# Patient Record
Sex: FEMALE | Race: WHITE | NOT HISPANIC OR LATINO | Employment: PART TIME | URBAN - METROPOLITAN AREA
[De-identification: names, ages, dates, MRNs, and addresses within clinical notes are randomized per-mention and may not be internally consistent; named-entity substitution may affect disease eponyms.]

---

## 2020-03-31 ENCOUNTER — TELEPHONE (OUTPATIENT)
Dept: FAMILY MEDICINE | Facility: CLINIC | Age: 30
End: 2020-03-31

## 2020-03-31 NOTE — TELEPHONE ENCOUNTER
Reason for call:  Patient reporting a symptom    Symptom or request: Patient's significant other was tested positive for chlamydia. She is currently having a burning sensation when she urinates and has been needing to go to the bathroom often. She is wondering if she needs to be tested, or what should she do.     Duration (how long have symptoms been present): the last few days    Have you been treated for this before? No    Additional comments:     Phone Number patient can be reached at:  Home number on file [phone] (home)    Best Time:  any    Can we leave a detailed message on this number:  YES    Call taken on 3/31/2020 at 5:01 PM by Anabel Law CNA

## 2023-09-11 ASSESSMENT — PATIENT HEALTH QUESTIONNAIRE - PHQ9
SUM OF ALL RESPONSES TO PHQ QUESTIONS 1-9: 9
10. IF YOU CHECKED OFF ANY PROBLEMS, HOW DIFFICULT HAVE THESE PROBLEMS MADE IT FOR YOU TO DO YOUR WORK, TAKE CARE OF THINGS AT HOME, OR GET ALONG WITH OTHER PEOPLE: SOMEWHAT DIFFICULT
SUM OF ALL RESPONSES TO PHQ QUESTIONS 1-9: 9

## 2023-09-12 ENCOUNTER — OFFICE VISIT (OUTPATIENT)
Dept: OBGYN | Facility: OTHER | Age: 33
End: 2023-09-12
Payer: COMMERCIAL

## 2023-09-12 ENCOUNTER — TELEPHONE (OUTPATIENT)
Dept: OBGYN | Facility: OTHER | Age: 33
End: 2023-09-12

## 2023-09-12 VITALS
BODY MASS INDEX: 18.98 KG/M2 | DIASTOLIC BLOOD PRESSURE: 86 MMHG | SYSTOLIC BLOOD PRESSURE: 130 MMHG | WEIGHT: 132.28 LBS

## 2023-09-12 DIAGNOSIS — N97.9 FEMALE INFERTILITY: Primary | ICD-10-CM

## 2023-09-12 PROCEDURE — 99203 OFFICE O/P NEW LOW 30 MIN: CPT | Performed by: OBSTETRICS & GYNECOLOGY

## 2023-09-12 NOTE — PROGRESS NOTES
"  SUBJECTIVE:       I spent a total of 35 minutes on the care of Mecca on the day of service including 25 minutes of face-to-face time with remainder in chart review, care coordination, documentation on the date of service.                                                Mecca Bills is a 33 year old female who presents to clinic today for the following health issue(s):  No chief complaint on file.    Mecca is a healthy 33-year-old P0 last menstrual period 9 4 on no birth control she presents for discussion regarding infertility.  She has been trying for approximately 5 months to get pregnant but met with no success.  She has never had children before and she has been with the same significant other for 12 years.  She does use pullout as a method of birth control.  Up until then she has met with success with the birth control perspective but now is trying to get pregnant.    GYN history she notes that her periods are \"not normal \".  On the other hand everything in the review of systems of her GYN history is normal except for a history of chlamydia when she was a teenager.  She notes that when she was younger her periods always came on the 15th of the month or thereabouts.  Currently her cycles, every 30 days and last 7 days.  She has normal lumina which includes cramping, bloating, moodiness, breast tenderness.  That happens with each of her cycles.  She has had no abnormal Pap smears.  She has used Plan B in the past also.  She has had no pelvic surgeries or GYN procedures.    Social history:  She is with her significant other for 12 years now and not conceived    Medications:  Negative    Past surgical history:  Negative for any abdominal procedures    Past medical history:  Negative    Family history:  Dad has had a pacemaker placed for arrhythmia she is not well familiarized with his particular situation.  There is diabetes only in the grandmother.    She denies significant amount of time today regarding " evaluation and treatment and diagrammed the uterus.  Explained the significance of the chlamydial infection and how that can affect fertility with scarring of the tubes and that an HSG is appropriate.  We talked about labs to have done on day 3 and that for her would include a TSH, FSH, and prolactin.  Also discussed having a HSG and that order has been placed.  The HSG I advised her to take Motrin ahead of time as an 600 mg.  This would be done between day 6 and day 10 and she knows that it may cause some cramping.  With her lengthy relationship and not getting pregnant even though the use to pull out methods suggest that its possible that the fertility issue is a male factor and she should have him evaluated with a semen analysis.  She will get back to us with regards to his name and number and and will need to have that set up as he has never been to an [facility] facility.    All her questions were addressed and at this present time we will do the main 2 things of lab work and an HSG.  Get back to work regarding potential semen analysis.  We also discussed timing of colitis and with this cycle that is consistently had 30 days suggest that ovulation is likely to be day 16-kemi.  She does use an ovulation predictor.    Assessment is subfertility    Plan:  Lab work is been ordered as is an HSG and then follow-up after that in the next 30 days    No LMP recorded..     Patient is sexually active, No obstetric history on file..  Using oral contraceptives for contraception.    reports that she has been smoking. She has never used smokeless tobacco.    STD testing offered?  Declined    Health maintenance updated:  no    Today's PHQ-2 Score:       9/11/2023    10:19 AM   PHQ-2 ( 1999 [employer])   Q1: Little interest or pleasure in doing things 1   Q2: Feeling down, depressed or hopeless 2   PHQ-2 Score 3   Q1: Little interest or pleasure in doing things Several days   Q2: Feeling down, depressed or hopeless More than half the  days   PHQ-2 Score 3     Today's PHQ-9 Score:       9/11/2023    10:19 AM   PHQ-9 SCORE   PHQ-9 Total Score [facility] 9 (Mild depression)   PHQ-9 Total Score 9     Today's SAUD-7 Score:       6/22/2015    11:11 AM   SAUD-7 SCORE   Total Score 18       Problem list and histories reviewed & adjusted, as indicated.  Additional history: as documented.    Patient Active Problem List   Diagnosis    Generalized anxiety disorder    Major depression, recurrent (H)     No past surgical history on file.   Social History     Tobacco Use    Smoking status: Every Day    Smokeless tobacco: Never   Substance Use Topics    Alcohol use: Yes           Negative family history of: Breast Cancer              Current Outpatient Medications   Medication Sig    citalopram (CELEXA) 20 MG tablet Take 1/2 tablet (10 mg) for 1 weeks, then increase to 1 tablet orally daily    norgestim-eth estrad triphasic (ORTHO TRI-CYCLEN, 28,) 0.18/0.215/0.25 MG-35 MCG TABS tablet Take 1 tablet by mouth daily     No current facility-administered medications for this visit.     No Known Allergies      OBJECTIVE:     There were no vitals taken for this visit.  There is no height or weight on file to calculate BMI.    Exam:  Deferred    In-Clinic Test Results:  No results found for this or any previous visit (from the past 24 hour(s)).    ASSESSMENT/PLAN:                                                        Patient Instructions                                                      If you have any questions regarding your visit, Please contact your care team.     [facility] Access Services: [phone]    To Schedule an Appointment 24/7  Call: [address] [place] [address] HOURS TELEPHONE NUMBER     Brandyn Slater MD    Medical Assistant    Magalie Singh-Surgery Scheduler  Mil[facility]ros-Surgery Scheduler     [place]  1:00 pm-4:30 pm    Tuesday-[place]  7:30 am-4:30 pm    Wednesday-[place]  7:30 am-4:30 pm        Typical Surgery  Days: Monday or Thursday       [facility]  [address] [place] [id number]  [phone] appointment line  [phone] Fax    Imaging Scheduling all locations  [phone]    **Surgeries** Our Surgery Schedulers will contact you to schedule. If you do not receive a call within 3 business days, please call [phone].    If you need a medication refill, please contact your pharmacy. Please allow 3 business days for your refill to be completed.    As always, Thank you for trusting us with your healthcare needs!                   see additional instructions from your care team below          Brandyn Slater MD  [facility]  Answers submitted by the patient for this visit:  Patient Health Questionnaire (Submitted on 9/11/2023)  If you checked off any problems, how difficult have these problems made it for you to do your work, take care of things at home, or get along with other people?: Somewhat difficult  PHQ9 TOTAL SCORE: 9

## 2023-09-12 NOTE — TELEPHONE ENCOUNTER
Pt was seen by Dr. Slater today, 9/12.  Dr. Slater ordered an HSG for pt.    Pt is on cycle day #9, LMP: 9/4.  Reached out to the [facility] imaging coordinator to see if there is an opening for an HSG in [facility] in the next few days for her to do her HSG this cycle.    [facility] could do an HSG tomorrow, 9/13, at 1 pm.     Spoke with pt and she will take this appt.  Gave instructions for HSG.    Jannette Schmitz RN

## 2023-09-12 NOTE — PATIENT INSTRUCTIONS
If you have any questions regarding your visit, Please contact your care team.     [employer] Services: [phone]    To Schedule an Appointment 24/7  [facility]: [phone]     Brandyn Slater MD    Medical Assistant    Magalie Singh-Surgery Scheduler  [facility]-Surgery Scheduler     Monday-[place]  1:00 pm-4:30 pm    Tuesday-[place]  7:30 am-4:30 pm    Wednesday-[place]  7:30 am-4:30 pm        Typical Surgery Days: Monday or Thursday       [address] [address]  [phone] appointment line  [phone] Fax    Imaging Scheduling all locations  [phone]    **Surgeries** Our Surgery Schedulers will contact you to schedule. If you do not receive a call within 3 business days, please call [phone].    If you need a medication refill, please contact your pharmacy. Please allow 3 business days for your refill to be completed.    As always, Thank you for trusting us with your healthcare needs!                   see additional instructions from your care team below

## 2023-09-13 ENCOUNTER — ANCILLARY PROCEDURE (OUTPATIENT)
Dept: GENERAL RADIOLOGY | Facility: CLINIC | Age: 33
End: 2023-09-13
Attending: OBSTETRICS & GYNECOLOGY
Payer: COMMERCIAL

## 2023-09-13 DIAGNOSIS — N97.9 FEMALE INFERTILITY: ICD-10-CM

## 2023-09-13 PROCEDURE — 58340 CATHETER FOR HYSTEROGRAPHY: CPT | Performed by: STUDENT IN AN ORGANIZED HEALTH CARE EDUCATION/TRAINING PROGRAM

## 2023-09-13 PROCEDURE — 74740 X-RAY FEMALE GENITAL TRACT: CPT | Performed by: STUDENT IN AN ORGANIZED HEALTH CARE EDUCATION/TRAINING PROGRAM

## 2023-10-02 ENCOUNTER — VIRTUAL VISIT (OUTPATIENT)
Dept: FAMILY MEDICINE | Facility: CLINIC | Age: 33
End: 2023-10-02
Payer: COMMERCIAL

## 2023-10-02 VITALS — BODY MASS INDEX: 18.45 KG/M2 | HEIGHT: 71 IN

## 2023-10-02 DIAGNOSIS — Z34.00 SUPERVISION OF NORMAL FIRST PREGNANCY: Primary | ICD-10-CM

## 2023-10-02 PROCEDURE — 99207 PR NO CHARGE NURSE ONLY: CPT

## 2023-10-02 NOTE — PATIENT INSTRUCTIONS
Learning About Pregnancy  Your Care Instructions     Your health in the early weeks of your pregnancy is particularly important for your baby's health. Take good care of yourself. Anything you do that harms your body can also harm your baby.  Make sure to go to all of your doctor appointments. Regular checkups will help keep you and your baby healthy.  How can you care for yourself at home?  Diet    Eat a balanced diet. Make sure your diet includes plenty of beans, peas, and leafy green vegetables.     Do not skip meals or go for many hours without eating. If you are nauseated, try to eat a small, healthy snack every 2 to 3 hours.     Do not eat fish that has a high level of mercury, such as shark, swordfish, or mackerel. Do not eat more than one can of tuna each week.     Drink plenty of fluids. If you have kidney, heart, or liver disease and have to limit fluids, talk with your doctor before you increase the amount of fluids you drink.     Cut down on caffeine, such as coffee, tea, and cola.     Do not drink alcohol, such as beer, wine, or hard liquor.     Take a multivitamin that contains at least 400 micrograms (mcg) of folic acid to help prevent birth defects. Fortified cereal and whole wheat bread are good additional sources of folic acid.     Increase the calcium in your diet. Try to drink a quart of skim milk each day. You may also take calcium supplements and choose foods such as cheese and yogurt.   Lifestyle    Make sure you go to your follow-up appointments.     Get plenty of rest. You may be unusually tired while you are pregnant.     Get at least 30 minutes of exercise on most days of the week. Walking is a good choice. If you have not exercised in the past, start out slowly. Take several short walks each day.     Do not smoke. If you need help quitting, talk to your doctor about stop-smoking programs. These can increase your chances of quitting for good.     Do not touch cat feces or litter boxes.  Also, wash your hands after you handle raw meat, and fully cook all meat before you eat it. Wear gloves when you work in the yard or garden, and wash your hands well when you are done. Cat feces, raw or undercooked meat, and contaminated dirt can cause an infection that may harm your baby or lead to a miscarriage.     Avoid things that can make your body too hot and may be harmful to your baby, such as a hot tub or sauna. Or talk with your doctor before doing anything that raises your body temperature. Your doctor can tell you if it's safe.     Avoid chemical fumes, paint fumes, or poisons.     Do not use illegal drugs, marijuana, or alcohol.   Medicines    Review all of your medicines with your doctor. Some of your routine medicines may need to be changed to protect your baby.     Use acetaminophen (Tylenol) to relieve minor problems, such as a mild headache or backache or a mild fever with cold symptoms. Do not use nonsteroidal anti-inflammatory drugs (NSAIDs), such as ibuprofen (Advil, Motrin) or naproxen (Aleve), unless your doctor says it is okay.     Do not take two or more pain medicines at the same time unless the doctor told you to. Many pain medicines have acetaminophen, which is Tylenol. Too much acetaminophen (Tylenol) can be harmful.     Take your medicines exactly as prescribed. Call your doctor if you think you are having a problem with your medicine.   To manage morning sickness    If you feel sick when you first wake up, try eating a small snack (such as crackers) before you get out of bed. Allow some time to digest the snack, and then get out of bed slowly.     Do not skip meals or go for long periods without eating. An empty stomach can make nausea worse.     Eat small, frequent meals instead of three large meals each day.     Drink plenty of fluids.     Eat foods that are high in protein but low in fat.     If you are taking iron supplements, ask your doctor if they are necessary. Iron can make  "nausea worse.     Avoid any smells, such as coffee, that make you feel sick.     Get lots of rest. Morning sickness may be worse when you are tired.   Follow-up care is a key part of your treatment and safety. Be sure to make and go to all appointments, and call your doctor if you are having problems. It's also a good idea to know your test results and keep a list of the medicines you take.  Where can you learn more?  Go to https://www.[employer].net/patiented  Enter E868 in the search box to learn more about \"Learning About Pregnancy.\"  Current as of: November 9, 2022               Content Version: 13.7    5493-4513 [employer].   Care instructions adapted under license by your healthcare professional. If you have questions about a medical condition or this instruction, always ask your healthcare professional. [employer] disclaims any warranty or liability for your use of this information.      Weeks 6 to 10 of Your Pregnancy: Care Instructions  During these weeks of pregnancy, your body goes through many changes. You may start to feel different, both in your body and your emotions. Each pregnancy is different, so there's no \"right\" way to feel. These early weeks are a time to make healthy choices for you and your pregnancy.    Take a daily prenatal vitamin. Choose one with folic acid in it.   Avoid alcohol, tobacco, and drugs (including marijuana). If you need help quitting, talk to your doctor.     Drink plenty of liquids.  Be sure to drink enough water. And limit sodas, other sweetened drinks, and caffeine.     Choose foods that are good sources of calcium, iron, and folate.  You can try dairy products, dark leafy greens, fortified orange juice and cereals, almonds, broccoli, dried fruit, and beans.     Avoid foods that may be harmful.  Don't eat raw meat, deli meat, raw seafood, or raw eggs. Avoid soft cheese and unpasteurized dairy, like Brie and blue cheese. And don't eat fish that " "contains a lot of mercury, like shark and swordfish.     Don't touch navjot litter or cat poop.  They can cause an infection that could be harmful during pregnancy.     Avoid things that can make your body too hot.  For example, avoid hot tubs and saunas.     Soothe morning sickness.  Try eating 5 or 6 small meals a day, getting some fresh air, or using sean to control symptoms.     Ask your doctor about flu and COVID-19 shots.  Getting them can help protect against infection.   Follow-up care is a key part of your treatment and safety. Be sure to make and go to all appointments, and call your doctor if you are having problems. It's also a good idea to know your test results and keep a list of the medicines you take.  Where can you learn more?  Go to https://www.Klosetshop.CommonFloor/patiented  Enter G112 in the search box to learn more about \"Weeks 6 to 10 of Your Pregnancy: Care Instructions.\"  Current as of: November 9, 2022               Content Version: 13.7 2006-2023 [employer].   Care instructions adapted under license by your healthcare professional. If you have questions about a medical condition or this instruction, always ask your healthcare professional. [employer] disclaims any warranty or liability for your use of this information.         Managing Morning Sickness (01:55)  Your health professional recommends that you watch this short online health video.  Learn tips for dealing with morning sickness, no matter what time of day you have it.  Purpose:  Gives tips for managing morning sickness, including eating small low-fat meals and avoiding caffeine and spicy food.  Goal:  The user will learn tips for dealing with morning sickness during pregnancy.     How to watch the video    Scan the QR code   OR Visit the website    [email or web address]   Current as of: November 9, 2022               Content Version: 13.7 2006-2023 [employer].   Care instructions " adapted under license by your healthcare professional. If you have questions about a medical condition or this instruction, always ask your healthcare professional. Healthwise, [employer] disclaims any warranty or liability for your use of this information.      Pregnancy and Heartburn: Care Instructions  Overview     Heartburn is a common problem during pregnancy.  Heartburn happens when stomach acid backs up into the tube that carries food to the stomach. This tube is called the esophagus. Early in pregnancy, heartburn is caused by hormone changes that slow down digestion. Later on, it's also caused by the large uterus pushing up on the stomach.  Even though you can't fix the cause, there are things you can do to get relief. Treating heartburn during pregnancy focuses first on making lifestyle changes, like changing what and how you eat, and on taking medicines.  Heartburn usually improves or goes away after childbirth.  Follow-up care is a key part of your treatment and safety. Be sure to make and go to all appointments, and call your doctor if you are having problems. It's also a good idea to know your test results and keep a list of the medicines you take.  How can you care for yourself at home?  Eat small, frequent meals.  Avoid foods that make your symptoms worse, such as chocolate, peppermint, and spicy foods. Avoid drinks with caffeine, such as coffee, tea, and sodas.  Avoid bending over or lying down after meals.  Take a short walk after you eat.  If heartburn is a problem at night, do not eat for 2 hours before bedtime.  Take antacids like Mylanta, Maalox, Rolaids, or Tums. Do not take antacids that have sodium bicarbonate, magnesium trisilicate, or aspirin. Be careful when you take over-the-counter antacid medicines. Many of these medicines have aspirin in them. While you are pregnant, do not take aspirin or medicines that contain aspirin unless your doctor says it is okay.  If you're not getting  "relief, talk to your doctor. You may be able to take a stronger acid-reducing medicine.  When should you call for help?   Call your doctor now or seek immediate medical care if:    You have new or worse belly pain.     You are vomiting.   Watch closely for changes in your health, and be sure to contact your doctor if:    You have new or worse symptoms of reflux.     You are losing weight.     You have trouble or pain swallowing.     You do not get better as expected.   Where can you learn more?  Go to https://www.[employer]net/patiented  Enter U946 in the search box to learn more about \"Pregnancy and Heartburn: Care Instructions.\"  Current as of: November 9, 2022               Content Version: 13.7 2006-2023 [employer].   Care instructions adapted under license by your healthcare professional. If you have questions about a medical condition or this instruction, always ask your healthcare professional. [employer] disclaims any warranty or liability for your use of this information.      Constipation: Care Instructions  Overview     Constipation means that you have a hard time passing stools (bowel movements). People pass stools from 3 times a day to once every 3 days. What is normal for you may be different. Constipation may occur with pain in the rectum and cramping. The pain may get worse when you try to pass stools. Sometimes there are small amounts of bright red blood on toilet paper or the surface of stools. This is because of enlarged veins near the rectum (hemorrhoids).  A few changes in your diet and lifestyle may help you avoid ongoing constipation. Your doctor may also prescribe medicine to help loosen your stool.  Some medicines can cause constipation. These include pain medicines and antidepressants. Tell your doctor about all the medicines you take. Your doctor may want to make a medicine change to ease your symptoms.  Follow-up care is a key part of your treatment and " "safety. Be sure to make and go to all appointments, and call your doctor if you are having problems. It's also a good idea to know your test results and keep a list of the medicines you take.  How can you care for yourself at home?  Drink plenty of fluids. If you have kidney, heart, or liver disease and have to limit fluids, talk with your doctor before you increase the amount of fluids you drink.  Include high-fiber foods in your diet each day. These include fruits, vegetables, beans, and whole grains.  Get at least 30 minutes of exercise on most days of the week. Walking is a good choice. You also may want to do other activities, such as running, swimming, cycling, or playing tennis or team sports.  Take a fiber supplement, such as Citrucel or Metamucil, every day. Read and follow all instructions on the label.  Schedule time each day for a bowel movement. A daily routine may help. Take your time having a bowel movement, but don't sit for more than 10 minutes at a time. And don't strain too much.  Support your feet with a small step stool when you sit on the toilet. This helps flex your hips and places your pelvis in a squatting position.  Your doctor may recommend an over-the-counter laxative to relieve your constipation. Examples are Milk of Magnesia and MiraLax. Read and follow all instructions on the label. Do not use laxatives on a long-term basis.  When should you call for help?   Call your doctor now or seek immediate medical care if:    You have new or worse belly pain.     You have new or worse nausea or vomiting.     You have blood in your stools.   Watch closely for changes in your health, and be sure to contact your doctor if:    Your constipation is getting worse.     You do not get better as expected.   Where can you learn more?  Go to https://www.healthwise.net/patiented  Enter P343 in the search box to learn more about \"Constipation: Care Instructions.\"  Current as of: March 22, " "2023               Content Version: 13.7 2006-2023 [employer].   Care instructions adapted under license by your healthcare professional. If you have questions about a medical condition or this instruction, always ask your healthcare professional. [employer] disclaims any warranty or liability for your use of this information.      Learning About High-Iron Foods  What foods are high in iron?     The foods you eat contain nutrients, such as vitamins and minerals. Iron is a nutrient. Your body needs the right amount to stay healthy and work as it should. You can use the list below to help you make choices about which foods to eat.  Here are some foods that contain iron. They have 1 to 2 milligrams of iron per serving.  Fruits  Figs (dried), 5 figs  Vegetables  Asparagus (canned), 6 vang  Mike, beet, Swiss chard, or turnip greens, 1 cup  Dried peas, cooked,   cup  Seaweed, spirulina (dried),   cup  Spinach, (cooked)   cup or (raw) 1 cup  Grains  Cereals, fortified with iron, 1 cup  Grits (instant, cooked), fortified with iron,   cup  Meats and other protein foods  Beans (kidney, lima, navy, white), canned or cooked,   cup  Beef or lamb, 3 oz  Chicken giblets, 3 oz  Chickpeas (garbanzo beans),   cup  Liver of beef, lamb, or pork, 3 oz  Oysters (cooked), 3 oz  Sardines (canned), 3 oz  Soybeans (boiled),   cup  Tofu (firm),   cup  Work with your doctor to find out how much of this nutrient you need. Depending on your health, you may need more or less of it in your diet.  Where can you learn more?  Go to https://www.healthTagstr.net/patiented  Enter R005 in the search box to learn more about \"Learning About High-Iron Foods.\"  Current as of: March 1, 2023               Content Version: 13.7 2006-2023 [employer].   Care instructions adapted under license by your healthcare professional. If you have questions about a medical condition or this instruction, always ask your " "healthcare professional. [employer], [facility] disclaims any warranty or liability for your use of this information.      Rh Antibodies Screening During Pregnancy: About This Test  What is it?     The Rh antibodies screening test is a blood test. It checks your blood for Rh antibodies. If you have Rh-negative blood and have been exposed to Rh-positive blood, your immune system may make antibodies to attack the Rh-positive blood. When a pregnant woman has these antibodies, it is called Rh sensitization.  Why is this test done?  The Rh antibodies screening test is done during pregnancy to find out if your baby is at risk for Rh disease. This can happen if you have Rh-negative blood and your baby has Rh-positive blood. If your Rh-negative blood mixes with Rh-positive blood, your immune system will make antibodies to attack the Rh-positive blood.  During pregnancy, these antibodies could attach to the baby's red blood cells. This can cause your baby to have serious health problems. The results of this test will help your doctor know how to best care for you and your baby during your pregnancy.  How do you prepare for the test?  In general, there's nothing you have to do before this test, unless your doctor tells you to.  How is the test done?  A health professional uses a needle to take a blood sample, usually from the arm.  What happens after the test?  You will probably be able to go home right away. It depends on the reason for the test.  You can go back to your usual activities right away.  Follow-up care is a key part of your treatment and safety. Be sure to make and go to all appointments, and call your doctor if you are having problems. It's also a good idea to keep a list of the medicines you take. Ask your doctor when you can expect to have your test results.  Where can you learn more?  Go to https://www.[employer].net/patiented  Enter P722 in the search box to learn more about \"Rh Antibodies Screening " "During Pregnancy: About This Test.\"  Current as of: 2022               Content Version: 13.7    6510-2469 [employer].   Care instructions adapted under license by your healthcare professional. If you have questions about a medical condition or this instruction, always ask your healthcare professional. [employer] disclaims any warranty or liability for your use of this information.      Learning About Preventing Rh Disease  What is Rh disease?     Rh disease can be a serious problem in pregnancy. It happens when substances called antibodies in the mother's blood cause red blood cells in her baby's blood to be destroyed. This can occur when the blood types of a mother and her baby do not match.  All blood has an Rh factor. This is what makes a blood type positive or negative. When you are Rh-negative, your baby may be Rh-negative or Rh-positive. If your baby has Rh-positive blood and it mixes with yours, your body will make antibodies. This is called Rh sensitization.  Most of the time, this is not a problem in a first pregnancy. But in future pregnancies, it could cause Rh disease.  A  with Rh disease has mild anemia and may have jaundice. In severe cases, anemia, jaundice, and swelling can be very dangerous or fatal. Some babies need to be delivered early. Some need special care in the NICU. A very sick baby will need a blood transfusion before or after birth.  Fortunately, Rh sensitization is usually easy to prevent.  That's why it's important to get your Rh status checked in your first trimester. It doesn't cause any warning signs. A blood test is the only way to know if you are Rh-sensitive or are at risk for it.  How can you prevent Rh disease?  If you are Rh-negative, your doctor gives you an Rh immune globulin shot (such as RhoGAM). It helps prevent your body from making the antibodies that attack your baby's red blood cells.  Timing is important. You need the " "shot at certain times during your pregnancy. And you need one anytime there is a chance that your baby's blood might mix with yours. That can happen with certain prenatal tests or when you have pregnancy bleeding, such as:  Right after any pregnancy loss, amniocentesis, or CVS testing.  After turning of a breech baby.  Before and maybe after childbirth. Your doctor gives you a shot around week 28. If your  is Rh-positive, you will have another shot.  Follow-up care is a key part of your treatment and safety. Be sure to make and go to all appointments, and call your doctor if you are having problems. It's also a good idea to know your test results and keep a list of the medicines you take.  Where can you learn more?  Go to https://www.Flickme.LightPole/patiented  Enter W177 in the search box to learn more about \"Learning About Preventing Rh Disease.\"  Current as of: 2022               Content Version: 13.7    8691-[address].   Care instructions adapted under license by your healthcare professional. If you have questions about a medical condition or this instruction, always ask your healthcare professional. [employer] disclaims any warranty or liability for your use of this information.      Learning About Rh Immunoglobulin Shots  Introduction     An Rh immunoglobulin shot is given to pregnant women who have Rh-negative blood.  You may have Rh-negative blood, and your baby may have Rh-positive blood. If the two types of blood mix, your body will make antibodies. This is called Rh sensitization. Most of the time, this is not a problem the first time you're pregnant. But it could cause problems in future pregnancies.  This shot keeps your body from making the antibodies. You get the shot around 28 weeks of pregnancy. After the birth, your baby's blood is tested. If the blood is Rh positive, you will get another shot. You may also get the shot if you have vaginal bleeding " "while you are pregnant or if you have a miscarriage. These shots protect future pregnancies.  Women with Rh negative blood will need this shot each time they get pregnant.  Example  Rh immunoglobulin (HypRho-D, MICRhoGAM, and RhoGAM)  Possible side effects  Rare side effects may include:  Some mild pain where you got the shot.  A slight fever.  An allergic reaction.  You may have other side effects not listed here. Check the information that comes with your medicine.  What to know about taking this medicine  You may need more than one shot. You may need the shot again:  After amniocentesis, fetal blood sampling, or chorionic villus sampling tests.  If you have bleeding in your second or third trimester.  After turning of a breech baby.  After an injury to the belly while you are pregnant.  After a miscarriage or an .  Before or right after treatment for an ectopic or a partial molar pregnancy.  Tell your doctor if you have any allergies or have had a bad response to medicines in the past.  If you get this shot within 3 months of getting a live-virus vaccine, the vaccine may not work. Your doctor will tell you if you need more vaccine.  Check with your doctor or pharmacist before you use any other medicines. This includes over-the-counter medicines. Make sure your doctor knows all of the medicines, vitamins, herbs, and supplements you take. Taking some medicines at the same time can cause problems.  Where can you learn more?  Go to https://www.[employer]z.net/patiented  Enter V615 in the search box to learn more about \"Learning About Rh Immunoglobulin Shots.\"  Current as of: 2022               Content Version: 13.7    6860-7968 [employer].   Care instructions adapted under license by your healthcare professional. If you have questions about a medical condition or this instruction, always ask your healthcare professional. [employer] disclaims any warranty or liability for " your use of this information.      Rubella (Citizen of [place] Measles): Care Instructions  Overview  Rubella, also called Citizen of [place] measles or 3-day measles, is a disease caused by a virus. It spreads by coughs, sneezes, and close contact. Rubella usually is mild and does not cause long-term problems. But if you are pregnant and get it, you can give the disease to your unborn baby. This can cause serious birth defects.  While you have rubella, you may get a rash and a mild fever, and the lymph glands in your neck may swell. Older children often have a fever, eye pain, a sore throat, and body aches. You can relieve most symptoms with care at home. Avoid being around others, especially pregnant people, until your rash has been gone for at least 4 days. People who have not had this disease before or have not had the vaccine have the greatest chance of getting the virus.  Follow-up care is a key part of your treatment and safety. Be sure to make and go to all appointments, and call your doctor if you are having problems. It's also a good idea to know your test results and keep a list of the medicines you take.  How can you care for yourself at home?  Drink plenty of fluids. If you have kidney, heart, or liver disease and have to limit fluids, talk with your doctor before you increase the amount of fluids you drink.  Get plenty of rest to help your body heal.  Take an over-the-counter pain medicine, such as acetaminophen (Tylenol), ibuprofen (Advil, Motrin), or naproxen (Aleve), to reduce fever and discomfort. Read and follow all instructions on the label. Do not give aspirin to anyone younger than 20. It has been linked to Reye syndrome, a serious illness.  Do not take two or more pain medicines at the same time unless the doctor told you to. Many pain medicines have acetaminophen, which is Tylenol. Too much acetaminophen (Tylenol) can be harmful.  Try not to scratch the rash. Put cold, wet cloths on the rash to reduce itching.  Do  "not smoke. Smoking can make your symptoms worse. If you need help quitting, talk to your doctor about stop-smoking programs and medicines. These can increase your chances of quitting for good.  Avoid contact with people who have never had rubella and who have not been immunized.  When should you call for help?   Call your doctor now or seek immediate medical care if:    You have a fever with a stiff neck or a severe headache.     You are sensitive to light or feel very sleepy or confused.   Watch closely for changes in your health, and be sure to contact your doctor if:    You do not get better as expected.   Where can you learn more?  Go to https://www.[email or web address].net/patiented  Enter B812 in the search box to learn more about \"Rubella (Macedonian Measles): Care Instructions.\"  Current as of: 2022               Content Version: 13.7    9212-5755 [employer].   Care instructions adapted under license by your healthcare professional. If you have questions about a medical condition or this instruction, always ask your healthcare professional. [employer] disclaims any warranty or liability for your use of this information.      Gonorrhea and Chlamydia: About These Tests  What is it?  These tests use a sample of urine or other body fluid to look for the bacteria that cause these sexually transmitted infections (STIs). The fluid sample can come from the cervix, vagina, rectum, throat, or eyes.  Why is this test done?  These tests may be done to:  Find out if symptoms are caused by gonorrhea or chlamydia.  Check people who are at high risk of being infected with gonorrhea or chlamydia.  Retest people several months after they have been treated for gonorrhea or chlamydia.  Check for infection in your  if you had a gonorrhea or chlamydia infection at the time of delivery.  How can you prepare for the test?  If you are going to have a urine test, do not urinate for at least 1 hour " "before the test.  If you think you may have chlamydia or gonorrhea, don't have sexual intercourse until you get your test results. And you may want to have tests for other STIs, such as HIV.  How is the test done?  For a direct sample, a swab is used to collect body fluid from the cervix, vagina, rectum, throat, or eyes. Your doctor may collect the sample. Or you may be given instructions on how to collect your own sample.  For a urine sample, you will collect the urine that comes out when you first start to urinate. Don't wipe the genital area clean before you urinate.  How long does the test take?  The test will take a few minutes.  What happens after the test?  You will be able to go home right away.  You can go back to your usual activities right away.  If you do have an infection, don't have sexual intercourse for 7 days after you start treatment. And your sex partner(s) should also be treated.  Follow-up care is a key part of your treatment and safety. Be sure to make and go to all appointments, and call your doctor if you are having problems. It's also a good idea to keep a list of the medicines you take. Ask your doctor when you can expect to have your test results.  Where can you learn more?  Go to [email or web address]://[email or web address]/patiented  Enter K976 in the search box to learn more about \"Gonorrhea and Chlamydia: About These Tests.\"  Current as of: August 2, 2022               Content Version: 13.7    7161-5523 [employer].   Care instructions adapted under license by your healthcare professional. If you have questions about a medical condition or this instruction, always ask your healthcare professional. [employer] disclaims any warranty or liability for your use of this information.      Trichomoniasis: About This Test  What is it?     This test uses a sample of urine or other body fluid to look for the tiny parasite that causes trichomoniasis (also called trich). The fluid sample " can come from the vagina, cervix, or urethra. Your doctor may choose to use one or more of many available tests.  Why is it done?  A trich test may be done to:  Find out if symptoms are caused by trich.  Check people who are at high risk for being infected with trich.  Check after treatment to make sure that the infection is gone.  How do you prepare for the test?  If you are going to have a urine test, do not urinate for at least 1 hour before the test.  How is the test done?  For a direct sample, a swab is used to collect body fluid from the cervix, vagina, or urethra. Your doctor may collect the sample. Or you may be given instructions on how to collect your own sample.  For a urine sample, you will collect the urine that comes out when you first start to urinate. Don't wipe the area clean before you urinate.  How long does the test take?  It will take a few minutes to collect a sample.  What happens after the test?  You can go home right away.  You can go back to your usual activities right away.  You may get the test results the same day or several days later. It depends on the test used.  If you do have an infection, don't have sexual intercourse for 7 days after you start treatment. Your sex partner(s) should also be treated.  Follow-up care is a key part of your treatment and safety. Be sure to make and go to all appointments, and call your doctor if you are having problems. Ask your doctor when you can expect to have your test results.  Current as of: August 2, 2022               Content Version: 13.7    4324-2481 [employer].   Care instructions adapted under license by your healthcare professional. If you have questions about a medical condition or this instruction, always ask your healthcare professional. [employer] disclaims any warranty or liability for your use of this information.      HIV Testing: Care Instructions  Overview     You can get tested for the human  "immunodeficiency virus (HIV). This test checks for HIV antibodies and antigens in your blood. If they are found, the test is positive.  If HIV antibodies or antigens are not found, you may need a repeat test to be sure the results are correct. Or your doctor may want to do a different test.  Follow-up care is a key part of your treatment and safety. Be sure to make and go to all appointments, and call your doctor if you are having problems. It's also a good idea to know your test results and keep a list of the medicines you take.  What do the results mean?  Normal result  A normal result means that no HIV antibodies or antigens were found in your blood. And if you had a test that checked for HIV RNA or DNA, none was found. Normal results are called negative.  You may need more testing to be sure the test results are correct.  Uncertain result  Test results don't clearly show whether you have an HIV infection. This is usually called an indeterminate result. This may happen before HIV antibodies or antigens develop. Or it may happen when some other type of antibody or antigen interferes with the results. If this occurs, you will probably have another test right away.  Abnormal result  An abnormal result means that you have HIV antibodies or antigens in your blood. These results are called positive.  A positive test will be confirmed by another type of test. This is because some tests can cause false-positive results. No one is considered HIV-positive until the result is confirmed by a test that shows HIV RNA or DNA in the person's blood.  If your test result is positive, your doctor will talk to you about starting treatment.  Where can you learn more?  Go to https://[email or web address].net/patiented  Enter T792 in the search box to learn more about \"HIV Testing: Care Instructions.\"  Current as of: October 31, 2022               Content Version: 13.7    1507-4565 [employer].   Care instructions adapted under " license by your healthcare professional. If you have questions about a medical condition or this instruction, always ask your healthcare professional. [employer] disclaims any warranty or liability for your use of this information.      Hepatitis C Virus Tests: About These Tests  What are they?     Hepatitis C virus tests are blood tests that check for substances in the blood that show whether you have hepatitis C now or had it in the past. The tests can also tell you what type of hepatitis C you have and how severe the disease is. This can help your doctor with treatment.  If the tests show that you have long-term hepatitis C, you need to take steps to prevent spreading the disease.  Why are these tests done?  You may need these tests if:  You have symptoms of hepatitis.  You may have been exposed to the virus. You are more likely to have been exposed to the virus if you inject drugs or are exposed to body fluids (such as if you are a health care worker).  You've had other tests that show you have liver problems.  You are 18 to 79 years old.  You have an HIV infection.  The tests also are done to help your doctor decide about your treatment and see how well it works.  How do you prepare for the test?  In general, there's nothing you have to do before this test, unless your doctor tells you to.  How is the test done?  A health professional uses a needle to take a blood sample, usually from the arm.  What happens after these tests?  You will probably be able to go home right away.  You can go back to your usual activities right away.  Follow-up care is a key part of your treatment and safety. Be sure to make and go to all appointments, and call your doctor if you are having problems. It's also a good idea to keep a list of the medicines you take. Ask your doctor when you can expect to have your test results.  Where can you learn more?  Go to https://www.[employer].net/patiented  Enter W551 in the search  "box to learn more about \"Hepatitis C Virus Tests: About These Tests.\"  Current as of: October 31, 2022               Content Version: 13.7    9601-4742 [employer].   Care instructions adapted under license by your healthcare professional. If you have questions about a medical condition or this instruction, always ask your healthcare professional. [employer] disclaims any warranty or liability for your use of this information.      Learning About Fetal Ultrasound Results  What is a fetal ultrasound?     Fetal ultrasound is a test that lets your doctor see an image of your baby. Your doctor learns information about your baby from this picture. You may find out, for example, if you are having a boy or a girl. But the main reason you have this test is to get information about your baby's health.  (You may hear your baby called a fetus. This is a common medical term for a baby that's growing in the mother's uterus.)  What kind of information can you learn from this test?  The findings of an ultrasound fall into two categories, normal and abnormal.  Normal  The fetus is the right size for its age.  The placenta is the expected size and does not cover the cervix.  There is enough amniotic fluid in the uterus.  No birth defects can be seen.  Abnormal  The fetus is small or large for its age.  The placenta covers the cervix.  There is too much or too little amniotic fluid in the uterus.  The fetus may have a birth defect.  What does an abnormal result mean?  Abnormal seems to imply that something is wrong with your baby. But what it means is that the test has shown something the doctor wants to take a closer look at.  And that's what happens next. Your doctor will talk to you about what further test or tests you may need.  What do the results mean?  Some of the things your doctor may see on an abnormal ultrasound include:  Echogenic bowel.  The bowel looks very bright on the screen. This could " mean that there's blood in the bowel. Or it could mean that something is blocking the small bowel.  Increased nuchal translucency.  The ultrasound measures the thickness at the back of the baby's neck. An increase in thickness is sometimes an early sign of Down syndrome.  Increased or decreased amniotic fluid.  The doctor will look for a reason for the level of amniotic fluid and will watch the pregnancy closely as it progresses.  Large ventricles.  Ventricles in the brain look larger than they should. Your doctor may take a closer look at the brain.  Renal pyelectasis/hydronephrosis.  The ultrasound measures the fluid around the kidney. If there is more fluid than expected, there is a chance of urinary tract or kidney problems.  Short long bones.  The ultrasound measures certain arm and leg bones. A long bone (humerus or femur) that is shorter than average could be a sign of Down syndrome.  Subchorionic hemorrhage.  An ultrasound can show bleeding under one of the membranes that surrounds the fetus. Some women don't have symptoms of bleeding. The ultrasound can find this problem when women are not bleeding from their vagina. Women who have this condition have a slightly higher chance of miscarriage.  What do you do now?  Take a deep breath, and let it out. Keep in mind that an abnormal finding on an ultrasound, after it's coupled with more information, may:  Turn out to be nothing.  Turn out to be something mild that won't affect the baby.  Turn out to be something more serious. But if this happens, early diagnosis helps you and your doctor plan treatment options sooner rather than later.  Your medical team is there for you. So are your family and friends. Ask questions, and get the help and support you need.  Follow-up care is a key part of your treatment and safety. Be sure to make and go to all appointments, and call your doctor if you are having problems. It's also a good idea to know your test results and keep  "a list of the medicines you take.  Where can you learn more?  Go to https://www.[email or web address].net/patiented  Enter K451 in the search box to learn more about \"Learning About Fetal Ultrasound Results.\"  Current as of: November 9, 2022               Content Version: 13.7    5056-2274 [employer].   Care instructions adapted under license by your healthcare professional. If you have questions about a medical condition or this instruction, always ask your healthcare professional. [employer] disclaims any warranty or liability for your use of this information.      Learning About Prenatal Visits  Your Care Instructions     Regular prenatal visits are very important during any pregnancy. These quick office visits may seem simple and routine. But they can help you and your baby stay healthy. Your doctor is watching for problems that can only be found by regularly checking you and your baby. The visits also give you and your doctor time to build a good relationship.  Many women have prenatal visits every 4 to 6 weeks until week 28 of pregnancy. Then the visits become more frequent. This is often every 2 to 3 weeks through week 36 of pregnancy. In the final month of pregnancy, you likely will see your doctor every week. You may have a different schedule if you have a medical problem or are a teen.  At different times in your pregnancy, you will have exams and tests. Some are routine. Others are done only when there is a chance of a problem. Everything healthy you do for your body helps your growing baby. Rest when you need it. Eat well, drink plenty of water, and exercise regularly.  What happens during a prenatal visit?  You will have blood pressure checks, along with urine tests. You also may have blood tests. If you need to go to the bathroom while waiting for the doctor, tell the nurse. He or she will give you a sample cup so your urine can be tested.  You will be weighed and have your belly " measured.  Your doctor may listen to your baby's heartbeat with a special stethoscope.  In your second trimester, your doctor will check your blood sugar (glucose tolerance test) for diabetes that can occur during pregnancy. This is gestational diabetes, which can harm your baby.  You will have tests to check for infections that could harm your . These include group B streptococcus and hepatitis B.  Your doctor may do ultrasounds to check for problems. This also checks your baby's position. An ultrasound uses sound waves to produce a picture of your baby.  You may have other tests at any time during your pregnancy.  Use your visits to discuss with your doctor any concerns you have.  How can you care for yourself at home?  Get plenty of rest.  Exercise every day, if your doctor says it is okay. If you have not exercised in the past, start out slowly. Take many short walks each day.  Eat a balanced diet. Make sure your diet includes plenty of beans, peas, and leafy green vegetables.  Drink plenty of fluids. Cut down on drinks with caffeine, such as coffee, tea, and cola. If you have kidney, heart, or liver disease and have to limit fluids, talk with your doctor before you increase the amount of fluids you drink.  Avoid chemical fumes, paint fumes, and poisons. Do not use alcohol, marijuana, or illegal drugs. Do not smoke, vape, or use tobacco. If you need help quitting, talk to your doctor about stop-smoking programs and medicines. These can increase your chances of quitting for good.  Review all of your medicines with your doctor. Some of your routine medicines may need to be changed to protect your baby. Do not stop or start taking any medicines without talking to your doctor first.  Follow-up care is a key part of your treatment and safety. Be sure to make and go to all appointments, and call your doctor if you are having problems. It's also a good idea to know your test results and keep a list of the  "medicines you take.  Where can you learn more?  Go to [email or web address]/[email or web address]  Enter J502 in the search box to learn more about \"Learning About Prenatal Visits.\"  Current as of: November 9, 2022               Content Version: 13.7    8832-0898 [employer].   Care instructions adapted under license by your healthcare professional. If you have questions about a medical condition or this instruction, always ask your healthcare professional. [employer] disclaims any warranty or liability for your use of this information.      Domestic Abuse: Care Instructions  Overview     If you want to save this information but don't think it is safe to take it home, see if a trusted friend can keep it for you. Plan ahead. Know who you can call for help, and memorize the phone number.   Be careful online too. Your online activity may be seen by others. Do not use your personal computer or device to read about this topic. Use a safe computer such as one at work, a friend's house, or a library.    Domestic abuse is different from an argument now and then. It is a pattern of abuse that one person uses to control another person's behavior. It may start with threats and name-calling. Then, it may lead to more serious acts, like pushing and slapping. The abuse also may occur in other areas. For example, the abuser may withhold money or spend a partner's money without their knowledge.  Abuse can cause serious harm. You are more likely to have a long-term health problem from the injuries and stress of living in a violent relationship. People who are sexually abused by their partners have more sexually transmitted infections and unwanted pregnancies. Anyone can be abused in relationships. Anyone who is abused also faces emotional pain.  If you are pregnant, abuse can cause problems such as poor weight gain, infections, and bleeding. Abuse during this time may increase your baby's risk of low birth " "weight, premature birth, and death.  Follow-up care is a key part of your treatment and safety. Be sure to make and go to all appointments, and call your doctor if you are having problems. It's also a good idea to know your test results and keep a list of the medicines you take.  How can you care for yourself at home?  If you do not have a safe place to stay, discuss this with your doctor before you leave.  Have a plan for where to go, how to leave your house, and where to stay in case of an emergency. Do not tell your partner about your plan. Contact:  The National Domestic Violence Hotline toll-free at 1-181.316.7988. They can help you find resources in your area.  Your local police department, hospital, or clinic for information about shelters and safe homes near you.  Talk to a trusted friend or neighbor or a Denominational counselor. Do not feel that you have to hide what happened.  Teach your children how to call for help in an emergency.  Be alert to warning signs, such as threats, heavy alcohol use, or drug use. This can help you avoid danger.  If you can, make sure that there are no guns or other weapons in the house.  When should you call for help?   Call 911 anytime you think you may need emergency care. For example, call if:    You or someone else has just been abused.     You think you or someone else is in danger of being abused.   Watch closely for changes in your health, and be sure to contact your doctor if you have any problems.  Where can you learn more?  Go to https://www.[email or web address].net/patiented  Enter G282 in the search box to learn more about \"Domestic Abuse: Care Instructions.\"  Current as of: February 27, 2023               Content Version: 13.7    1034-5468 [employer] [facility].   Care instructions adapted under license by your healthcare professional. If you have questions about a medical condition or this instruction, always ask your healthcare professional. [employer], [employer] [facility] " disclaims any warranty or liability for your use of this information.      Domestic Violence Safety Instructions: Care Instructions  Overview     If you want to save this information but don't think it is safe to take it home, see if a trusted friend can keep it for you. Plan ahead. Know who you can call for help, and memorize the phone number.   Be careful online too. Your online activity may be seen by others. Do not use your personal computer or device to read about this topic. Use a safe computer such as one at work, a friend's house, or a library.    When you are abused by a spouse or partner, you can take actions to protect yourself and your children.  You can increase your safety whether you decide to stay with your spouse or partner or you decide to leave. You can also prepare an action plan and kit ahead of time. This will allow you to leave quickly when you decide to. Remember, you cannot stop your partner's abuse, but you can find help for you and your children. No one deserves to be abused.  Follow-up care is a key part of your treatment and safety. Be sure to make and go to all appointments, and call your doctor if you are having problems. It's also a good idea to know your test results and keep a list of the medicines you take.  How can you care for yourself at home?  Make a plan for your safety   If you decide to stay with your abusive spouse or partner, you can do the following to increase your safety:  Decide what works best to keep you safe in an emergency.  Decide who you can call to help you in an emergency.  Decide if you will call the police if you get hurt again. If you can, agree on a signal with your children or neighbor to call the police for you if you need help. You can flash lights or hang something out of a window.  Choose a place to go for a short time if you need to leave home. Memorize the address and phone number.  Learn escape routes out of your home in case you need to leave in a  hurry. Teach your children different ways to get out of the house quickly if they need to.  Take objects that can be used as weapons (guns, knives, hammers) and hide them or lock them up.  Learn the number of a domestic violence shelter. Talk to the people there about how they can help.  Find out about other community resources that can help you.  Take pictures of bruises or other injuries if you can. You can also take pictures of things your abuser has broken.  Teach your children that violence is never okay. Tell them that they do not deserve to be hurt.  Pack a bag   Prepare a kit with things you will need if you leave the house suddenly. You can try to hide this in your house, or you can leave it with a friend or relative you can trust. You should include the following items in the kit:  A set of keys to your house and car.  Emergency phone numbers and addresses. You might also want to have a map and a small flashlight in case you need to leave in the night.  Money such as cash or checks. You can also ask a trusted friend or family member to hold money for you.  Copies of legal documents such as house and car titles or rent receipts, birth certificates, Social Security card, voter registration, marriage and 's licenses, and your children's health records.  Personal items you would need for a few days, such as clothes, a toothbrush, toothpaste, and any medicines you or your children need.  A favorite toy or book for your child or children.  Diapers and bottles, if you have very young children.  Pictures that show signs of abuse and violence. You may also add pictures of your abuser.  If you leave   If you decide to leave, you can take the following steps:  Go to the emergency room at a hospital if you have been hurt.  Ask the police to be with you as you leave. They can protect you as you leave the house.  If you decide to leave secretly, remember that activities can be tracked. Your abuser may still have  "access to your cell phone, email, and credit cards. It may be possible for these to be traced. Always be aware of your surroundings.  Take the kit you have prepared. If this is an emergency, do not worry about gathering up anything. Just leave--your safety is most important.  If your abuser moves out, change the locks on the doors. If you have a security system, change the access code.  When should you call for help?   Call 911 anytime you think you may need emergency care. For example, call if:    You or someone else has just been abused.     You think you or someone else is in danger of being abused.   Watch closely for changes in your health, and be sure to contact your doctor if you have any problems.  Where can you learn more?  Go to https://www.[employer].net/patiented  Enter A752 in the search box to learn more about \"Domestic Violence Safety Instructions: Care Instructions.\"  Current as of: October 20, 2022               Content Version: 13.7    2763-5471 [employer].   Care instructions adapted under license by your healthcare professional. If you have questions about a medical condition or this instruction, always ask your healthcare professional. [employer] disclaims any warranty or liability for your use of this information.      Learning About Domestic Abuse  What is domestic abuse?  Domestic abuse is threats or violent behavior in a personal relationship. It can happen between past or current partners or spouses. It's also called domestic violence or intimate partner violence.  Domestic abuse can affect people of any ethnic group, race, or Moravian. It can affect teens, adults, or the elderly. And it can happen to people of any sexual identity or social status. But most abuse victims are women.  Abusers use fear, bullying, and threats to control their partners. They control what their partners do, where they go, or who they see. They may act jealous, controlling, or " possessive. These early signs of abuse may happen soon after the start of the relationship. Sometimes it can be hard to notice abuse at first. But after the relationship becomes more serious, the abuse may get worse.  If you are being abused in your relationship, it's important to get help. The abuse is not your fault, and you don't have to face it alone.  Be careful  It may not be safe to take home domestic abuse information like this handout. Some people ask a trusted friend to keep it for them. It's also important to plan ahead and to memorize the phone number of places you can go for help. If you are concerned about your safety, do not use your computer, smartphone, or tablet to read about domestic abuse.   What are the types of domestic abuse?  Abuse can be emotional, physical, or sexual.  Emotional abuse  Emotional abuse is a pattern of threats, insults, or controlling behavior. It includes verbal abuse. It goes beyond healthy disagreements in a relationship. It's a sign of an unhealthy relationship, and it may be against the law.  Do you feel threatened, intimidated, or controlled? Does your partner threaten your children, other family members, or pets?  Does your partner:  Use jokes meant to embarrass or shame you?  Call you names?  Tell you that you are a bad parent or threaten to take away your children?  Threaten to have you or your family members deported?  Control your access to money or other basic needs?  Control what you do, who you see or talk to, or where you go?  Another form of emotional abuse is denying that it is happening. Or the abuser may act like the abuse is no big deal or is your fault.  Sexual abuse  With sexual abuse, abusers may try to convince or force you to have sex. They may force you into sex acts you're not comfortable with. Or they may sexually assault you. Sexual abuse can happen even if you are in a committed relationship.  Physical abuse  Physical abuse means that a partner  hits, kicks, or physically hurts you. Physical abuse that starts with a slap might lead to kicking, shoving, and choking over time. The abuser may also threaten to hurt or kill you.  What problems can domestic abuse lead to?  Domestic abuse can be very dangerous. It can cause serious, repeated injury. It can even lead to death.  All forms of abuse can cause long-term health problems from the stress of a violent relationship. Verbal abuse can lead to sexual and physical abuse.  Abuse causes emotional pain, depression, anxiety, and post-traumatic stress. Sexual abuse can lead to sexually transmitted infections (including HIV/AIDS) and unplanned pregnancy.  Pregnancy can be a very dangerous time for people in abusive relationships. Pregnant people who are abused may have anemia, infections, bleeding, or poor weight gain. Abuse during this time may also increase your baby's risk of low birth weight, premature birth, and death.  It can be hard for some victims of domestic abuse to ask for help or to leave their relationship. You may feel scared, stuck, or not sure what steps to take. But it's important not to ignore abuse. Talking to someone could be the first step to ending the abuse and taking care of your own health and happiness again.  Where can you get help?  Talk to a trusted friend. Find a local advocacy group, or talk to your doctor about the abuse.  Contact the National Domestic Violence Hotline at 5-308-210-OCSW (1-412.347.1866) for more safety tips. They can guide you to groups in your area that can help. Or go to the National Coalition Against Domestic Violence website at www.thehotline.org to learn more.  Domestic violence groups or a counselor in your area can help you make a safety plan for yourself and your children.  When to call for help  Call 911 anytime you think you may need emergency care. For example, call if:  You think that you or someone you know is in danger of being abused.  You have been  "hurt and can't have someone safely take you to emergency care.  You have just been abused.  A family member has just been abused.  Where can you learn more?  Go to https://www.[employer].net/patiented  Enter S665 in the search box to learn more about \"Learning About Domestic Abuse.\"  Current as of: February 27, 2023               Content Version: 13.7    5968-1618 [employer].   Care instructions adapted under license by your healthcare professional. If you have questions about a medical condition or this instruction, always ask your healthcare professional. [employer] disclaims any warranty or liability for your use of this information.      Vaginal Bleeding During Pregnancy: Care Instructions  Overview     It's common to have some vaginal spotting when you are pregnant. In some cases, the bleeding isn't serious. And there aren't any more problems with the pregnancy.  But sometimes bleeding is a sign of a more serious problem. This is more common if the bleeding is heavy or painful. Examples of more serious problems include miscarriage, an ectopic pregnancy, and a problem with the placenta.  You may have to see your doctor again to be sure everything is okay. You may also need more tests to find the cause of the bleeding.  Home treatment may be all you need. But it depends on what is causing the bleeding. Be sure to tell your doctor if you have any new symptoms or if your symptoms get worse.  The doctor has checked you carefully, but problems can develop later. If you notice any problems or new symptoms, get medical treatment right away.  Follow-up care is a key part of your treatment and safety. Be sure to make and go to all appointments, and call your doctor if you are having problems. It's also a good idea to know your test results and keep a list of the medicines you take.  How can you care for yourself at home?  If your doctor prescribed medicines, take them exactly as directed. Call " "your doctor if you think you are having a problem with your medicine.  Do not have vaginal sex until your doctor says it's okay.  Do not put anything in your vagina until your doctor says it's okay.  Ask your doctor about other activities you can or can't do.  Get a lot of rest. Being pregnant can make you tired.  Do not use nonsteroidal anti-inflammatory drugs (NSAIDs), such as ibuprofen (Advil, Motrin), naproxen (Aleve), or aspirin, unless your doctor says it is okay.  When should you call for help?   Call 911 anytime you think you may need emergency care. For example, call if:    You passed out (lost consciousness).     You have severe vaginal bleeding. This means you are soaking through a pad each hour for 2 or more hours.     You have sudden, severe pain in your belly or pelvis.   Call your doctor now or seek immediate medical care if:    You have new or worse vaginal bleeding.     You are dizzy or lightheaded, or you feel like you may faint.     You have pain in your belly, pelvis, or lower back.     You think that you are in labor.     You have a sudden release of fluid from your vagina.     You've been having regular contractions for an hour. This means that you've had at least 8 contractions within 1 hour or at least 4 contractions within 20 minutes, even after you change your position and drink fluids.     You notice that your baby has stopped moving or is moving much less than normal.   Watch closely for changes in your health, and be sure to contact your doctor if you have any problems.  Where can you learn more?  Go to https://www.[employer].net/patiented  Enter N829 in the search box to learn more about \"Vaginal Bleeding During Pregnancy: Care Instructions.\"  Current as of: November 9, 2022               Content Version: 13.7    0107-3054 [employer].   Care instructions adapted under license by your healthcare professional. If you have questions about a medical condition or this " instruction, always ask your healthcare professional. [employer] disclaims any warranty or liability for your use of this information.      Circumcision in Infants: What to Expect at Home  Your Child's Recovery  After circumcision, your baby's penis may look red and swollen. It may have petroleum jelly and gauze on it. The gauze will likely come off when your baby urinates. Follow your doctor's directions about whether to put clean gauze back on your baby's penis or to leave the gauze off. If you need to remove gauze from the penis, use warm water to soak the gauze and gently loosen it.  The doctor may have used a Plastibell device to do the circumcision. If so, your baby will have a plastic ring around the head of the penis. The ring should fall off by itself in 10 to 12 days.  A thin, yellow film may form over the area the day after the procedure. This is part of the normal healing process. It should go away in a few days.  Your baby may seem fussy while the area heals. It may hurt for your baby to urinate. This pain often gets better in 3 or 4 days. But it may last for up to 2 weeks.  Even though your baby's penis will likely start to feel better after 3 or 4 days, it may look worse. The penis often starts to look like it's getting better after about 7 to 10 days.  This care sheet gives you a general idea about how long it will take for your child to recover. But each child recovers at a different pace. Follow the steps below to help your child get better as quickly as possible.  How can you care for your child at home?  Activity    Let your baby rest as much as possible. Sleeping will help with recovery.     You can give your baby a sponge bath the day after surgery. Ask your doctor when it is okay to give your baby a bath.   Medicines    Your doctor will tell you if and when your child can restart any medicines. The doctor will also give you instructions about your child taking any new medicines.      Your doctor may recommend giving your baby acetaminophen (Tylenol) to help with pain after the procedure. Be safe with medicines. Give your child medicines exactly as prescribed. Call your doctor if you think your child is having a problem with a medicine.     Do not give your child two or more pain medicines at the same time unless the doctor told you to. Many pain medicines have acetaminophen, which is Tylenol. Too much acetaminophen (Tylenol) can be harmful.   Circumcision care    Always wash your hands before and after touching the circumcision area.     Gently wash your baby's penis with plain, warm water after each diaper change, and pat it dry. Do not use soap. Don't use hydrogen peroxide or alcohol. They can slow healing.     Do not try to remove the film that forms on the penis. The film will go away on its own.     Put plenty of petroleum jelly (such as Vaseline) on the circumcision area during each diaper change. This will prevent your baby's penis from sticking to the diaper while it heals.     Fasten your baby's diapers loosely so that there is less pressure on the penis while it heals.   Follow-up care is a key part of your child's treatment and safety. Be sure to make and go to all appointments, and call your doctor if your child is having problems. It's also a good idea to know your child's test results and keep a list of the medicines your child takes.  When should you call for help?   Call your doctor now or seek immediate medical care if:    Your baby has a fever over 100.4 F.     Your baby is extremely fussy or irritable, has a high-pitched cry, or refuses to eat.     Your baby does not have a wet diaper within 12 hours after the circumcision.     You find a spot of bleeding larger than a 2-inch [place] from the incision.     Your baby has signs of infection. Signs may include severe swelling; redness; a red streak on the shaft of the penis; or a thick, yellow discharge.   Watch closely for  "changes in your child's health, and be sure to contact your doctor if:    A Plastibell device was used for the circumcision and the ring has not fallen off after 10 to 12 days.   Where can you learn more?  Go to https://www.[employer].net/patiented  Enter S255 in the search box to learn more about \"Circumcision in Infants: What to Expect at Home.\"  Current as of: March 1, 2023               Content Version: 13.7    2020-2740 [employer].   Care instructions adapted under license by your healthcare professional. If you have questions about a medical condition or this instruction, always ask your healthcare professional. [employer] disclaims any warranty or liability for your use of this information.      "

## 2023-10-06 ENCOUNTER — LAB (OUTPATIENT)
Dept: LAB | Facility: CLINIC | Age: 33
End: 2023-10-06
Payer: COMMERCIAL

## 2023-10-06 DIAGNOSIS — Z34.00 SUPERVISION OF NORMAL FIRST PREGNANCY: ICD-10-CM

## 2023-10-06 LAB — HCG INTACT+B SERPL-ACNC: 228 MIU/ML

## 2023-10-06 PROCEDURE — 84702 CHORIONIC GONADOTROPIN TEST: CPT

## 2023-10-06 PROCEDURE — 36415 COLL VENOUS BLD VENIPUNCTURE: CPT

## 2023-10-07 ENCOUNTER — HEALTH MAINTENANCE LETTER (OUTPATIENT)
Age: 33
End: 2023-10-07

## 2023-10-13 ENCOUNTER — LAB (OUTPATIENT)
Dept: LAB | Facility: CLINIC | Age: 33
End: 2023-10-13
Payer: COMMERCIAL

## 2023-10-13 DIAGNOSIS — Z34.00 SUPERVISION OF NORMAL FIRST PREGNANCY: ICD-10-CM

## 2023-10-13 LAB — HCG INTACT+B SERPL-ACNC: 3187 MIU/ML

## 2023-10-13 PROCEDURE — 84702 CHORIONIC GONADOTROPIN TEST: CPT

## 2023-10-13 PROCEDURE — 36415 COLL VENOUS BLD VENIPUNCTURE: CPT

## 2023-10-28 ENCOUNTER — NURSE TRIAGE (OUTPATIENT)
Dept: NURSING | Facility: CLINIC | Age: 33
End: 2023-10-28
Payer: COMMERCIAL

## 2023-10-28 NOTE — TELEPHONE ENCOUNTER
"Triage call:    Patient is calling to concerns of persistent nausea for the past week now.    Patient reports she has thrown up once yesterday & once today so far.    Of note, patient reports she is approx 8 weeks pregnant, with this being her first pregnancy.    Per protocol, it is advised that the patient cont to treat & monitor her symptoms at home.    Rosibel Grady RN on 10/28/2023 at 9:12 AM     Reason for Disposition   MILD-MODERATE vomiting (e.g., 1-5 times / day) or nausea    Additional Information   Negative: Sounds like a life-threatening emergency to the triager   Negative: [1] Vomiting AND [2] contains red blood or black (\"coffee ground\") material  (Exception: Few red streaks in vomit that only happened once.)   Negative: [1] Insulin-dependent diabetes (Type I) AND [2] glucose > 400 mg/dl (22 mmol/l)   Negative: Recent head injury (within last 3 days)   Negative: Recent abdominal injury (within last 3 days)   Negative: Severe pain in one eye   Negative: [1] SEVERE vomiting (e.g., 6 or more times / day) AND [2] present > 8 hours   Negative: [1] Drinking very little AND [2] dehydration suspected (e.g., no urine > 12 hours, very dry mouth, very lightheaded)   Negative: Patient sounds very sick or weak to the triager   Negative: [1] Unable to keep ANY liquids down (without vomiting) AND [2] present > 24 hours   Negative: Weight loss > 5 pounds (2.5 kg) in last 2 weeks   Negative: Vomiting an essential or prescribed medication  (Exception: Taking prenatal vitamins.)   Negative: Recently started on a new medication   Negative: [1] MODERATE vomiting (e.g., 3 - 5 times / day) AND [2] present > 3 days   Negative: Pain or burning with passing urine (urination)   Negative: Substance use (drug use) or unhealthy alcohol use, known or suspected   Negative: High-risk adult (e.g., diabetes mellitus, AIDS/HIV)   Negative: [1] MILD vomiting (e.g., 1 - 2 times / day) AND [2] present > 3 days AND [3] started after the " 9th week of pregnancy   Negative: [1] MILD vomiting AND [2] present > 1 week AND [3] no improvement after using Morning Sickness Care Advice    Protocols used: Pregnancy - Morning Sickness (Nausea and Vomiting of Pregnancy)-A-

## 2023-10-29 LAB
ABO/RH(D): NORMAL
ANTIBODY SCREEN: NEGATIVE
SPECIMEN EXPIRATION DATE: NORMAL

## 2023-10-30 ENCOUNTER — TRANSFERRED RECORDS (OUTPATIENT)
Dept: MULTI SPECIALTY CLINIC | Facility: CLINIC | Age: 33
End: 2023-10-30

## 2023-10-30 ENCOUNTER — TELEPHONE (OUTPATIENT)
Dept: FAMILY MEDICINE | Facility: CLINIC | Age: 33
End: 2023-10-30

## 2023-10-30 ENCOUNTER — HOSPITAL ENCOUNTER (OUTPATIENT)
Dept: ULTRASOUND IMAGING | Facility: CLINIC | Age: 33
Discharge: HOME | End: 2023-10-30
Attending: FAMILY MEDICINE | Admitting: FAMILY MEDICINE
Payer: COMMERCIAL

## 2023-10-30 ENCOUNTER — LAB (OUTPATIENT)
Dept: LAB | Facility: CLINIC | Age: 33
End: 2023-10-30
Payer: COMMERCIAL

## 2023-10-30 DIAGNOSIS — O21.0 HYPEREMESIS GRAVIDARUM: Primary | ICD-10-CM

## 2023-10-30 DIAGNOSIS — Z34.00 SUPERVISION OF NORMAL FIRST PREGNANCY: ICD-10-CM

## 2023-10-30 LAB
ERYTHROCYTE [DISTWIDTH] IN BLOOD BY AUTOMATED COUNT: 12.3 % (ref 10–15)
HCT VFR BLD AUTO: 41.3 % (ref 35–47)
HGB BLD-MCNC: 14 G/DL (ref 11.7–15.7)
MCH RBC QN AUTO: 31.4 PG (ref 26.5–33)
MCHC RBC AUTO-ENTMCNC: 33.9 G/DL (ref 31.5–36.5)
MCV RBC AUTO: 93 FL (ref 78–100)
PAP SMEAR - HIM PATIENT REPORTED: NORMAL
PLATELET # BLD AUTO: 280 10E3/UL (ref 150–450)
RBC # BLD AUTO: 4.46 10E6/UL (ref 3.8–5.2)
WBC # BLD AUTO: 16 10E3/UL (ref 4–11)

## 2023-10-30 PROCEDURE — 86762 RUBELLA ANTIBODY: CPT

## 2023-10-30 PROCEDURE — 86900 BLOOD TYPING SEROLOGIC ABO: CPT

## 2023-10-30 PROCEDURE — 85027 COMPLETE CBC AUTOMATED: CPT

## 2023-10-30 PROCEDURE — 86850 RBC ANTIBODY SCREEN: CPT

## 2023-10-30 PROCEDURE — 87389 HIV-1 AG W/HIV-1&-2 AB AG IA: CPT

## 2023-10-30 PROCEDURE — 76801 OB US < 14 WKS SINGLE FETUS: CPT

## 2023-10-30 PROCEDURE — 86780 TREPONEMA PALLIDUM: CPT

## 2023-10-30 PROCEDURE — 87340 HEPATITIS B SURFACE AG IA: CPT

## 2023-10-30 PROCEDURE — 86803 HEPATITIS C AB TEST: CPT

## 2023-10-30 PROCEDURE — 86901 BLOOD TYPING SEROLOGIC RH(D): CPT

## 2023-10-30 PROCEDURE — 36415 COLL VENOUS BLD VENIPUNCTURE: CPT

## 2023-10-30 RX ORDER — ONDANSETRON 4 MG/1
4 TABLET, ORALLY DISINTEGRATING ORAL EVERY 8 HOURS PRN
Qty: 20 TABLET | Refills: 3 | Status: SHIPPED | OUTPATIENT
Start: 2023-10-30 | End: 2024-03-05

## 2023-10-30 NOTE — TELEPHONE ENCOUNTER
Spoke with patient.  She is 7w6d pregnant. Reports she has been vomiting since Friday. Today has been the worse day.     Has spearmint and sean and not helping.  Advised that she could try otc treatments for morning sickness include vitamin B-6 supplements (pyridoxine), sean and drugs such as doxylamine (Unisom).     She is wondering if you would prescribe her zofran?  Uses [facility]    Sherwin Burgess, MSN, APRN, PHN, FNP-C  [facility] ~ Registered Nurse  Clinic Triage ~ [place] [facility] [place]  October 30, 2023

## 2023-10-31 LAB
HBV SURFACE AG SERPL QL IA: NONREACTIVE
HCV AB SERPL QL IA: NONREACTIVE
HIV 1+2 AB+HIV1 P24 AG SERPL QL IA: NONREACTIVE
RUBV IGG SERPL QL IA: 14.8 INDEX
RUBV IGG SERPL QL IA: POSITIVE
T PALLIDUM AB SER QL: NONREACTIVE

## 2023-11-20 ENCOUNTER — PRENATAL OFFICE VISIT (OUTPATIENT)
Dept: FAMILY MEDICINE | Facility: CLINIC | Age: 33
End: 2023-11-20
Payer: COMMERCIAL

## 2023-11-20 VITALS
TEMPERATURE: 97.7 F | DIASTOLIC BLOOD PRESSURE: 64 MMHG | OXYGEN SATURATION: 99 % | RESPIRATION RATE: 18 BRPM | HEART RATE: 91 BPM | HEIGHT: 69 IN | WEIGHT: 144 LBS | SYSTOLIC BLOOD PRESSURE: 122 MMHG | BODY MASS INDEX: 21.33 KG/M2

## 2023-11-20 DIAGNOSIS — Z34.01 PREGNANCY, FIRST, OBSTETRICAL CARE, FIRST TRIMESTER: Primary | ICD-10-CM

## 2023-11-20 PROBLEM — F32.0 CURRENT MILD EPISODE OF MAJOR DEPRESSIVE DISORDER (H): Status: ACTIVE | Noted: 2020-10-09

## 2023-11-20 LAB
CLUE CELLS: NORMAL
TRICHOMONAS, WET PREP: NORMAL
WBC'S/HIGH POWER FIELD, WET PREP: NORMAL
YEAST, WET PREP: NORMAL

## 2023-11-20 PROCEDURE — 87591 N.GONORRHOEAE DNA AMP PROB: CPT | Performed by: FAMILY MEDICINE

## 2023-11-20 PROCEDURE — 87491 CHLMYD TRACH DNA AMP PROBE: CPT | Performed by: FAMILY MEDICINE

## 2023-11-20 PROCEDURE — 87210 SMEAR WET MOUNT SALINE/INK: CPT | Performed by: FAMILY MEDICINE

## 2023-11-20 PROCEDURE — 99203 OFFICE O/P NEW LOW 30 MIN: CPT | Performed by: FAMILY MEDICINE

## 2023-11-20 ASSESSMENT — PAIN SCALES - GENERAL: PAINLEVEL: NO PAIN (0)

## 2023-11-20 NOTE — PROGRESS NOTES
"  SUBJECTIVE:     HPI:    This is a 33 year old female patient,  who presents for her first obstetrical visit.    MARCOS: 2024, by Last Menstrual Period.  She is 10w6d weeks.  Her cycles are irregular.  Her last menstrual period was normal.   Since her LMP, she has experienced  nausea, emesis, fatigue, and urinary frequency).   She denies vaginal bleeding.    Additional History: Some depression and anxiety stable on  no meds     Have you travelled during the pregnancy?No  Have your sexual partner(s) travelled during the pregnancy?No    HISTORY:   Planned Pregnancy: Yes  Marital Status: Single  Occupation: [employer]  Living in Household: Spouse    Past History:  Her past medical history is non-contributory.      She has a history of   first baby     Since her last LMP she denies use of alcohol, tobacco and street drugs.    Past medical, surgical, social and family history were reviewed and updated in [employer].        Current Outpatient Medications   Medication    ondansetron (ZOFRAN ODT) 4 MG ODT tab     No current facility-administered medications for this visit.       ROS:   12 point review of systems negative other than symptoms noted below or in the HPI.      OBJECTIVE:     EXAM:  /64   Pulse 91   Temp 97.7  F (36.5  C) (Temporal)   Resp 18   Ht 1.753 m (5' 9\")   Wt 65.3 kg (144 lb)   LMP 2023   SpO2 99%   BMI 21.27 kg/m   Body mass index is 21.27 kg/m .    GENERAL: healthy, alert and no distress  EYES: Eyes grossly normal to inspection, PERRL and conjunctivae and sclerae normal  HENT: ear canals and TM's normal, nose and mouth without ulcers or lesions  NECK: no adenopathy, no asymmetry, masses, or scars and thyroid normal to palpation  RESP: lungs clear to auscultation - no rales, rhonchi or wheezes  BREAST: normal without masses, tenderness or nipple discharge and no palpable axillary masses or adenopathy  CV: regular rate and rhythm, normal S1 S2, no S3 or S4, no murmur, " click or rub, no peripheral edema and peripheral pulses strong  ABDOMEN: soft, nontender, no hepatosplenomegaly, no masses and bowel sounds normal  MS: no gross musculoskeletal defects noted, no edema  SKIN: no suspicious lesions or rashes  NEURO: Normal strength and tone, mentation intact and speech normal  PSYCH: mentation appears normal, affect normal/bright    ASSESSMENT/PLAN:     No diagnosis found.    33 year old , 10w6d weeks of pregnancy with MARCOS of 2024, by Last Menstrual Period    Discussed as follows: Plan for pregnancy care was discussed we will meet every 4 weeks until 30 weeks then every 2 weeks until 36 weeks then every week thereafter until delivery.  Certain laboratory studies and follow-ups were discussed.  Her questions were answered to her satisfaction.  We did talk at length about her vaping and her marijuana use hopefully she will taper off both as we move along with the pregnancy.  We will continue to encourage her her significant to other does not use either    Counseling given:   - Follow up in 4-6 weeks for return OB visit.  - Recommended weight gain for pregnancy: 15-25 lbs.         PLAN/PATIENT INSTRUCTIONS:      Tylor Mederos MD, MD

## 2023-11-21 LAB
C TRACH DNA SPEC QL NAA+PROBE: NEGATIVE
N GONORRHOEA DNA SPEC QL NAA+PROBE: NEGATIVE

## 2023-11-30 ENCOUNTER — NURSE TRIAGE (OUTPATIENT)
Dept: FAMILY MEDICINE | Facility: CLINIC | Age: 33
End: 2023-11-30
Payer: COMMERCIAL

## 2023-11-30 NOTE — TELEPHONE ENCOUNTER
"Are you able to work patient in for appointment today for emesis during pregnancy and unable to tolerate fluids?    ED advised? Further recommendations.   Patient declined ADS.    Nurse Triage SBAR    Is this a 2nd Level Triage? YES, LICENSED PRACTITIONER REVIEW IS REQUIRED    Situation:  Patient calling in reporting emesis in pregnancy that drastically increased today, patient reports she is unable to tolerate fluids or food.    12w2d  Estimated Date of Delivery: Jun 11, 2024      Background:   Patient endorses morning sickness during pregnancy. Patient states normally she gets sick when she drinks water right away when she gets up in the morning but today anything she tries to eat or drink comes up.     Unable to keep anything down   Patient has tried Gatorade, water, OJ  Unable to eat breakfast, has tried to suck on suck on skittles, both caused vomiting      Headache present  Lightheadedness present  Lips are dry  Last urinated two hours ago- dark yellow    No abdominal pain  No SOB  No chest pain  No abnormal discharge or bleeding    Zofran has not helped- \"I threw it up\"    Assessment:   RN advised patient should be seen to due possible onset of dehydration given headache, lightheadedness, concentrated urine and dry lips. RN offered to call ADS, patient declined due to distance. Patient requesting in clinic appointment. RN advised she can reach out to covering providers but due to symptoms ED recommended for possible need for IV hydration. Patient verbalizes understanding and requests follow up with covering OB provider as Dr. Mederos is not in clinic.     RN advised to try small bland food (saltines, bread/toast, rice) or sean. Small sips of liquid vs. Chugging. RN advised to present to ED prior to hearing back from RN if symptoms worsen, have someone else drive. Patient verbalized understanding and all questions answered.     Protocol Recommended Disposition:   Go To ED/UCC Now (Or To Office With PCP " "Approval)    Recommendation:   Are you able to work patient in for appointment today for emesis during pregnancy and unable to tolerate fluids?    ED advised? Further recommendations.   Patient declined ADS.    Routed to provider    Does the patient meet one of the following criteria for ADS visit consideration? 16+ years old, with an [facility] PCP     DELISA Recio, RN  [facility] ~ Registered Nurse  Clinic Triage ~ [place] [facility] [place]  November 30, 2023    Reason for Disposition   Drinking very little and has signs of dehydration (e.g., no urine > 12 hours, very dry mouth)    Additional Information   Negative: Sounds like a life-threatening emergency to the triager   Negative: Vaginal bleeding and pregnant < 20 weeks   Negative: Abdominal pain and pregnant < 20 weeks   Negative: Vomiting and pregnant 20 or more weeks   Negative: Headache is main symptom   Negative: Vomiting red blood or black (coffee ground) material   Negative: Insulin-dependent diabetes (Type I) and glucose > 400 mg/dL (22 mmol/L)   Negative: Recent head injury (within last 3 days)   Negative: Recent abdominal injury (within last 3 days)   Negative: Severe pain in one eye   Negative: SEVERE vomiting (e.g., > 6 times / day) and present > 8 hours   Negative: Unable to keep ANY liquids down (without vomiting) this past 24 hours    Answer Assessment - Initial Assessment Questions  1. VOMITING SEVERITY: \"How many times have you vomited in the past 24 hours?\"      - NONE     - MILD:  1 - 2 times/day     - MODERATE: 3 - 5 times/day, decreased oral intake without significant weight loss or symptoms of dehydration     - SEVERE: 6 or more times/day, vomits everything or nearly everything, with significant weight loss, symptoms of dehydration   \"At least 4-5 times\"  Just liquid and dry heaving        2. ONSET: \"When did the vomiting begin?\"   Getting sick every morning since patient found out she was pregnant but today is the worst.     3. FLUIDS: " "\"What fluids or food have you vomited up today?\" \"Are you able to keep any liquids down?\"      No     4. TREATMENT: \"What have you been doing so far to treat this?\"       Zofran- not helping threw up    5. DEHYDRATION: \"When was the last time you urinated?\" \"Are you feeling lightheaded?\" \"Weight loss?\"  Lightheaded  Last urinated two hours ago - dark yellow    6. PREGNANCY: \"How many weeks pregnant are you?\" \"How has the pregnancy been going?\"      Yes, 12 weeks    7. MARCOS: \"What date are you expecting to deliver?\"      6/11/2024    8. MEDICINES: \"What medicines are you taking?\" (e.g., prenatal vitamins, iron, vitamin B6)      Zofran   Prenatal    9. OTHER SYMPTOMS: \"Do you have any other symptoms?\"      No    Protocols used: Pregnancy - Morning Sickness (Nausea and Vomiting of Pregnancy)-A-[place]    "

## 2023-11-30 NOTE — TELEPHONE ENCOUNTER
Called patient to update her and she reports she is feeling better and was able to drink a full bottle of water and eat some crackers. She is going to try to hydrate at home for now and will call back if she worsens again.    Dean Sotelo,JOSIANEN, RN

## 2023-11-30 NOTE — TELEPHONE ENCOUNTER
Patient should go to ED.  Dr. Mederos is done for the day.  No available openings in clinic.  Rimma Bal, CNP

## 2023-12-10 ENCOUNTER — NURSE TRIAGE (OUTPATIENT)
Dept: NURSING | Facility: CLINIC | Age: 33
End: 2023-12-10

## 2023-12-10 ENCOUNTER — HOSPITAL ENCOUNTER (EMERGENCY)
Facility: CLINIC | Age: 33
Discharge: HOME | End: 2023-12-10
Attending: NURSE PRACTITIONER | Admitting: NURSE PRACTITIONER
Payer: COMMERCIAL

## 2023-12-10 VITALS
OXYGEN SATURATION: 99 % | HEIGHT: 70 IN | SYSTOLIC BLOOD PRESSURE: 134 MMHG | DIASTOLIC BLOOD PRESSURE: 76 MMHG | TEMPERATURE: 98.1 F | BODY MASS INDEX: 20.04 KG/M2 | RESPIRATION RATE: 20 BRPM | WEIGHT: 140 LBS | HEART RATE: 107 BPM

## 2023-12-10 DIAGNOSIS — U07.1 INFECTION DUE TO 2019 NOVEL CORONAVIRUS: ICD-10-CM

## 2023-12-10 LAB
ALBUMIN SERPL BCG-MCNC: 4.1 G/DL (ref 3.5–5.2)
ALP SERPL-CCNC: 61 U/L (ref 40–150)
ALT SERPL W P-5'-P-CCNC: 13 U/L (ref 0–50)
ANION GAP SERPL CALCULATED.3IONS-SCNC: 14 MMOL/L (ref 7–15)
AST SERPL W P-5'-P-CCNC: 22 U/L (ref 0–45)
BASOPHILS # BLD AUTO: 0 10E3/UL (ref 0–0.2)
BASOPHILS NFR BLD AUTO: 0 %
BILIRUB SERPL-MCNC: <0.2 MG/DL
BUN SERPL-MCNC: 7 MG/DL (ref 6–20)
CALCIUM SERPL-MCNC: 9.5 MG/DL (ref 8.6–10)
CHLORIDE SERPL-SCNC: 101 MMOL/L (ref 98–107)
CREAT SERPL-MCNC: 0.5 MG/DL (ref 0.51–0.95)
DEPRECATED HCO3 PLAS-SCNC: 21 MMOL/L (ref 22–29)
EGFRCR SERPLBLD CKD-EPI 2021: >90 ML/MIN/1.73M2
EOSINOPHIL # BLD AUTO: 0 10E3/UL (ref 0–0.7)
EOSINOPHIL NFR BLD AUTO: 0 %
ERYTHROCYTE [DISTWIDTH] IN BLOOD BY AUTOMATED COUNT: 12.6 % (ref 10–15)
FLUAV RNA SPEC QL NAA+PROBE: NEGATIVE
FLUBV RNA RESP QL NAA+PROBE: NEGATIVE
GLUCOSE SERPL-MCNC: 102 MG/DL (ref 70–99)
HCT VFR BLD AUTO: 35.6 % (ref 35–47)
HGB BLD-MCNC: 12.3 G/DL (ref 11.7–15.7)
HOLD SPECIMEN: NORMAL
HOLD SPECIMEN: NORMAL
IMM GRANULOCYTES # BLD: 0.1 10E3/UL
IMM GRANULOCYTES NFR BLD: 0 %
LYMPHOCYTES # BLD AUTO: 0.2 10E3/UL (ref 0.8–5.3)
LYMPHOCYTES NFR BLD AUTO: 2 %
MCH RBC QN AUTO: 31.9 PG (ref 26.5–33)
MCHC RBC AUTO-ENTMCNC: 34.6 G/DL (ref 31.5–36.5)
MCV RBC AUTO: 92 FL (ref 78–100)
MONOCYTES # BLD AUTO: 1.1 10E3/UL (ref 0–1.3)
MONOCYTES NFR BLD AUTO: 9 %
NEUTROPHILS # BLD AUTO: 9.8 10E3/UL (ref 1.6–8.3)
NEUTROPHILS NFR BLD AUTO: 89 %
NRBC # BLD AUTO: 0 10E3/UL
NRBC BLD AUTO-RTO: 0 /100
PLATELET # BLD AUTO: 253 10E3/UL (ref 150–450)
POTASSIUM SERPL-SCNC: 3.5 MMOL/L (ref 3.4–5.3)
PROT SERPL-MCNC: 7 G/DL (ref 6.4–8.3)
RBC # BLD AUTO: 3.86 10E6/UL (ref 3.8–5.2)
RSV RNA SPEC NAA+PROBE: NEGATIVE
SARS-COV-2 RNA RESP QL NAA+PROBE: POSITIVE
SODIUM SERPL-SCNC: 136 MMOL/L (ref 135–145)
WBC # BLD AUTO: 11.2 10E3/UL (ref 4–11)

## 2023-12-10 PROCEDURE — 99284 EMERGENCY DEPT VISIT MOD MDM: CPT | Performed by: NURSE PRACTITIONER

## 2023-12-10 PROCEDURE — 87637 SARSCOV2&INF A&B&RSV AMP PRB: CPT | Performed by: NURSE PRACTITIONER

## 2023-12-10 PROCEDURE — 36415 COLL VENOUS BLD VENIPUNCTURE: CPT | Performed by: NURSE PRACTITIONER

## 2023-12-10 PROCEDURE — 250N000011 HC RX IP 250 OP 636: Mod: JZ | Performed by: NURSE PRACTITIONER

## 2023-12-10 PROCEDURE — 96374 THER/PROPH/DIAG INJ IV PUSH: CPT | Performed by: NURSE PRACTITIONER

## 2023-12-10 PROCEDURE — 258N000003 HC RX IP 258 OP 636: Performed by: NURSE PRACTITIONER

## 2023-12-10 PROCEDURE — 99284 EMERGENCY DEPT VISIT MOD MDM: CPT | Mod: 25 | Performed by: NURSE PRACTITIONER

## 2023-12-10 PROCEDURE — 80053 COMPREHEN METABOLIC PANEL: CPT | Performed by: NURSE PRACTITIONER

## 2023-12-10 PROCEDURE — 96361 HYDRATE IV INFUSION ADD-ON: CPT | Performed by: NURSE PRACTITIONER

## 2023-12-10 PROCEDURE — 85014 HEMATOCRIT: CPT | Performed by: NURSE PRACTITIONER

## 2023-12-10 RX ORDER — SODIUM CHLORIDE, SODIUM LACTATE, POTASSIUM CHLORIDE, CALCIUM CHLORIDE 600; 310; 30; 20 MG/100ML; MG/100ML; MG/100ML; MG/100ML
INJECTION, SOLUTION INTRAVENOUS CONTINUOUS
Status: DISCONTINUED | OUTPATIENT
Start: 2023-12-10 | End: 2023-12-10 | Stop reason: HOSPADM

## 2023-12-10 RX ORDER — ONDANSETRON 2 MG/ML
4 INJECTION INTRAMUSCULAR; INTRAVENOUS EVERY 30 MIN PRN
Status: DISCONTINUED | OUTPATIENT
Start: 2023-12-10 | End: 2023-12-10 | Stop reason: HOSPADM

## 2023-12-10 RX ADMIN — SODIUM CHLORIDE, POTASSIUM CHLORIDE, SODIUM LACTATE AND CALCIUM CHLORIDE 1000 ML: 600; 310; 30; 20 INJECTION, SOLUTION INTRAVENOUS at 18:58

## 2023-12-10 RX ADMIN — ONDANSETRON 4 MG: 2 INJECTION INTRAMUSCULAR; INTRAVENOUS at 19:00

## 2023-12-10 ASSESSMENT — ACTIVITIES OF DAILY LIVING (ADL): ADLS_ACUITY_SCORE: 35

## 2023-12-10 NOTE — TELEPHONE ENCOUNTER
"OB Triage Call    Is patient's OB/Midwife with the [facility] or [facility]? [facility]- Proceed with triage     Reason for call: Vomiting    Assessment: Mecca is 13w5d pregnant    Started feeling unwell yesterday.  Thinks she caught the \"flu\" from her boyfriend who's been ill.    Symptoms started yesterday - she vomited x1    This morning:  - Body aches  - Vomiting - ~6+ episodes - \"every time I try to drink water\"  - Headache \"pounding\"  - Nasal congestion  - Temp = 99.6 orally (after drinking & vomiting)    She reports that she had \"morning sickness\" in her 1st trimester and that her symptoms had improved.    Plan: ER    Patient plans to deliver at [facility])    Patient's primary OB Provider is Dr. Tylor Mederos.      Per protocol recommendations Patient to be evaluated in ER. Patient's primary OB is [facility].      Is patient's delivering hospital on divert? No      13w5d    Estimated Date of Delivery: 2024        OB History    Para Term  AB Living   1 0 0 0 0 0   SAB IAB Ectopic Multiple Live Births   0 0 0 0 0      # Outcome Date GA Lbr Roberto/2nd Weight Sex Delivery Anes PTL Lv   1 Current               Obstetric Comments   EDC 2024 based on LMP.  Parenting with Frankie.       No results found for: \"GBS\"       Ila Baldwin RN   [facility] Nurse Advisor      Reason for Disposition   [1] SEVERE vomiting (e.g., 6 or more times / day) AND [2] present > 8 hours    Additional Information   Negative: Sounds like a life-threatening emergency to the triager   Negative: [1] Vomiting AND [2] contains red blood or black (\"coffee ground\") material  (Exception: Few red streaks in vomit that only happened once.)   Negative: [1] Insulin-dependent diabetes (Type I) AND [2] glucose > 400 mg/dl (22 mmol/l)   Negative: Recent head injury (within last 3 days)   Negative: Recent abdominal injury (within last 3 days)   Negative: Severe pain in one eye    Protocols used: " Pregnancy - Morning Sickness (Nausea and Vomiting of Pregnancy)-A-AH

## 2023-12-10 NOTE — ED TRIAGE NOTES
Reports being 3 months pregnant and constant vomitting starting today.      Triage Assessment (Adult)       Row Name 12/10/23 2343          Triage Assessment    Airway WDL WDL        Respiratory WDL    Respiratory WDL WDL        Skin Circulation/Temperature WDL    Skin Circulation/Temperature WDL WDL        Cardiac WDL    Cardiac WDL WDL        Peripheral/Neurovascular WDL    Peripheral Neurovascular WDL WDL        Cognitive/Neuro/Behavioral WDL    Cognitive/Neuro/Behavioral WDL WDL

## 2023-12-11 NOTE — DISCHARGE INSTRUCTIONS
Paxlovid as prescribed for 5 days.  Stay well hydrated.  Zofran as prescribed for nausea.  Tylenol 650 mg every 4-6 hours as needed for pain.    Return to the emergency department for vomiting-unable to keep fluids down, short of breath, chest pain, or any other symptoms of concern.

## 2023-12-11 NOTE — TELEPHONE ENCOUNTER
The patient a message she is not overly sick so she can decide to take the medication or not it is safe to take in pregnancy

## 2023-12-11 NOTE — ED PROVIDER NOTES
"  History     Chief Complaint   Patient presents with    Emesis During Pregnancy     HPI  Mecca Bills is a 33 year old female, 13w 5d pregnant, who is accompanied by her  for evaluation of fever, nausea, vomiting, nasal congestion, headache and body aches. Symptoms started yesterday, but much worse today. Unable to keep fluids down. Low grade fever at home. No diarrhea. No abdominal cramping, vaginal bleeding, or vaginal discharge. Patient's  had similar symptoms a few days ago and he is \"90% better\".       Allergies:  No Known Allergies    Problem List:    Patient Active Problem List    Diagnosis Date Noted    Current mild episode of major depressive disorder (H24) 10/09/2020     Priority: Medium    Generalized anxiety disorder 06/22/2015     Priority: Medium     Diagnosis updated by automated process. Provider to review and confirm.      Major depression, recurrent (H24) 06/22/2015     Priority: Medium        Past Medical History:    Past Medical History:   Diagnosis Date    History of anxiety     History of chlamydia     History of infertility        Past Surgical History:    Past Surgical History:   Procedure Laterality Date    WISDOM TOOTH EXTRACTION         Family History:    Family History   Problem Relation Age of Onset    Skin Cancer Mother     Pacemaker Father     No Known Problems Sister     No Known Problems Sister     Diabetes Maternal Grandmother     No Known Problems Maternal Grandfather     No Known Problems Paternal Grandmother     No Known Problems Paternal Grandfather     Breast Cancer No family hx of        Social History:  Marital Status:  Single [1]  Social History     Tobacco Use    Smoking status: Every Day    Smokeless tobacco: Never   Vaping Use    Vaping Use: Some days   Substance Use Topics    Alcohol use: Yes    Drug use: Yes     Types: Marijuana     Comment: occasional        Medications:    nirmatrelvir and ritonavir (PAXLOVID) 300 mg/100 mg therapy pack  ondansetron " "(ZOFRAN ODT) 4 MG ODT tab          Review of Systems  As mentioned above in the history present illness. All other systems were reviewed and are negative.    Physical Exam   BP: 134/76  Pulse: 107  Temp: 98.1  F (36.7  C)  Resp: 20  Height: 177.8 cm (5' 10\")  Weight: 63.5 kg (140 lb)  SpO2: 99 %      Physical Exam  Constitutional:       General: She is in acute distress.      Appearance: She is well-developed. She is ill-appearing.      Comments: Retching.   HENT:      Head: Normocephalic and atraumatic.      Right Ear: External ear normal.      Left Ear: External ear normal.      Nose: Congestion present.      Mouth/Throat:      Mouth: Mucous membranes are moist.   Eyes:      Conjunctiva/sclera: Conjunctivae normal.   Cardiovascular:      Rate and Rhythm: Normal rate and regular rhythm.      Heart sounds: Normal heart sounds. No murmur heard.  Pulmonary:      Effort: Pulmonary effort is normal. No respiratory distress.      Breath sounds: Normal breath sounds.   Musculoskeletal:         General: Normal range of motion.   Skin:     General: Skin is warm and dry.      Findings: No rash.   Neurological:      General: No focal deficit present.      Mental Status: She is alert and oriented to person, place, and time.         ED Course                 Procedures         Results for orders placed or performed during the hospital encounter of 12/10/23 (from the past 24 hour(s))   [place] Draw    Narrative    The following orders were created for panel order [place] Draw.  Procedure                               Abnormality         Status                     ---------                               -----------         ------                     Extra Green Top (Lithium...[[id number]]                      Final result               Extra Purple Top Tube[[id number]]                            Final result                 Please view results for these tests on the individual orders.   Extra Green Top (Lithium Heparin) Tube "   Result Value Ref Range    Hold Specimen JIC    Extra Purple Top Tube   Result Value Ref Range    Hold Specimen JIC    CBC with platelets differential    Narrative    The following orders were created for panel order CBC with platelets differential.  Procedure                               Abnormality         Status                     ---------                               -----------         ------                     CBC with platelets and d...[[id number]]  Abnormal            Final result                 Please view results for these tests on the individual orders.   Comprehensive metabolic panel   Result Value Ref Range    Sodium 136 135 - 145 mmol/L    Potassium 3.5 3.4 - 5.3 mmol/L    Carbon Dioxide (CO2) 21 (L) 22 - 29 mmol/L    Anion Gap 14 7 - 15 mmol/L    Urea Nitrogen 7.0 6.0 - 20.0 mg/dL    Creatinine 0.50 (L) 0.51 - 0.95 mg/dL    GFR Estimate >90 >60 mL/min/1.73m2    Calcium 9.5 8.6 - 10.0 mg/dL    Chloride 101 98 - 107 mmol/L    Glucose 102 (H) 70 - 99 mg/dL    Alkaline Phosphatase 61 40 - 150 U/L    AST 22 0 - 45 U/L    ALT 13 0 - 50 U/L    Protein Total 7.0 6.4 - 8.3 g/dL    Albumin 4.1 3.5 - 5.2 g/dL    Bilirubin Total <0.2 <=1.2 mg/dL   CBC with platelets and differential   Result Value Ref Range    WBC Count 11.2 (H) 4.0 - 11.0 10e3/uL    RBC Count 3.86 3.80 - 5.20 10e6/uL    Hemoglobin 12.3 11.7 - 15.7 g/dL    Hematocrit 35.6 35.0 - 47.0 %    MCV 92 78 - 100 fL    MCH 31.9 26.5 - 33.0 pg    MCHC 34.6 31.5 - 36.5 g/dL    RDW 12.6 10.0 - 15.0 %    Platelet Count 253 150 - 450 10e3/uL    % Neutrophils 89 %    % Lymphocytes 2 %    % Monocytes 9 %    % Eosinophils 0 %    % Basophils 0 %    % Immature Granulocytes 0 %    NRBCs per 100 WBC 0 <1 /100    Absolute Neutrophils 9.8 (H) 1.6 - 8.3 10e3/uL    Absolute Lymphocytes 0.2 (L) 0.8 - 5.3 10e3/uL    Absolute Monocytes 1.1 0.0 - 1.3 10e3/uL    Absolute Eosinophils 0.0 0.0 - 0.7 10e3/uL    Absolute Basophils 0.0 0.0 - 0.2 10e3/uL    Absolute Immature  Granulocytes 0.1 <=0.4 10e3/uL    Absolute NRBCs 0.0 10e3/uL   Symptomatic Influenza A/B, RSV, & SARS-CoV2 PCR (COVID-19) Nose    Specimen: Nose; Swab   Result Value Ref Range    Influenza A PCR Negative Negative    Influenza B PCR Negative Negative    RSV PCR Negative Negative    SARS CoV2 PCR Positive (A) Negative    Narrative    Testing was performed using the Xpert Xpress CoV2/Flu/RSV Assay on the [employer] GeneXpert Instrument. This test should be ordered for the detection of SARS-CoV-2, influenza, and RSV viruses in individuals who meet clinical and/or epidemiological criteria. Test performance is unknown in asymptomatic patients. This test is for in vitro diagnostic use under the FDA EUA for laboratories certified under CLIA to perform high or moderate complexity testing. This test has not been FDA cleared or approved. A negative result does not rule out the presence of PCR inhibitors in the specimen or target RNA in concentration below the limit of detection for the assay. If only one viral target is positive but coinfection with multiple targets is suspected, the sample should be re-tested with another FDA cleared, approved, or authorized test, if coinfection would change clinical management. This test was validated by the [facility]. These laboratories are certified under the Clinical Laboratory Improvement Amendments of 1988 (CLIA-88) as qualified to perform high complexity laboratory testing.       Medications   lactated ringers infusion (has no administration in time range)   ondansetron (ZOFRAN) injection 4 mg (4 mg Intravenous $Given 12/10/23 1900)   lactated ringers BOLUS 1,000 mL (0 mLs Intravenous Stopped 12/10/23 2008)       Assessments & Plan (with Medical Decision Making)     Patient is positive for Covid-19 infection, she is 13w+5d pregnant. Patient given IV LR bolus and IV Zofran. She felt much better, no further vomiting. She was able tolerate p.o intake and p.o. Tylenol.   Patient is high risk category  for more severe infection due to pregnancy and meets criteria for treatment with Paxlovid. I discussed this with patient. She is not on any medications that would be contraindicated for Paxlovid.  Patient is in agreement with plan.    Plan:  Paxlovid as prescribed for 5 days.  Stay well hydrated.  Zofran as prescribed for nausea.  Tylenol 650 mg every 4-6 hours as needed for pain.    Return to the emergency department for vomiting-unable to keep fluids down, short of breath, chest pain, or any other symptoms of concern.    New Prescriptions    NIRMATRELVIR AND RITONAVIR (PAXLOVID) 300 MG/100 MG THERAPY PACK    Take 3 tablets by mouth 2 times daily for 5 days Take 2 Nirmatrelvir tablets and 1 Ritonavir tablet twice daily for 5 days.       Final diagnoses:   Infection due to 2019 novel coronavirus       12/10/2023   [facility] EMERGENCY DEPT       Shannan Lazar APRN CNP  12/10/23 2016

## 2023-12-17 ENCOUNTER — MYC MEDICAL ADVICE (OUTPATIENT)
Dept: FAMILY MEDICINE | Facility: CLINIC | Age: 33
End: 2023-12-17
Payer: COMMERCIAL

## 2023-12-19 NOTE — TELEPHONE ENCOUNTER
Spoke with patient and she will ask about her NIPS testing at her appointment on 12/26/23.     She reports her appetite has returned and she is feeling much better. She will call back with any further concerns.    JOSIANE PalenciaN, RN

## 2023-12-26 ENCOUNTER — PRENATAL OFFICE VISIT (OUTPATIENT)
Dept: FAMILY MEDICINE | Facility: CLINIC | Age: 33
End: 2023-12-26
Payer: COMMERCIAL

## 2023-12-26 VITALS
HEART RATE: 99 BPM | RESPIRATION RATE: 18 BRPM | WEIGHT: 145.13 LBS | DIASTOLIC BLOOD PRESSURE: 62 MMHG | TEMPERATURE: 97.3 F | HEIGHT: 70 IN | SYSTOLIC BLOOD PRESSURE: 102 MMHG | BODY MASS INDEX: 20.78 KG/M2 | OXYGEN SATURATION: 100 %

## 2023-12-26 DIAGNOSIS — Z34.91 ENCNTR FOR SUPRVSN OF NORMAL PREG, UNSP, FIRST TRIMESTER: Primary | ICD-10-CM

## 2023-12-26 PROCEDURE — 36415 COLL VENOUS BLD VENIPUNCTURE: CPT | Performed by: FAMILY MEDICINE

## 2023-12-26 PROCEDURE — 99207 PR PRENATAL VISIT: CPT | Performed by: FAMILY MEDICINE

## 2023-12-26 ASSESSMENT — PATIENT HEALTH QUESTIONNAIRE - PHQ9
SUM OF ALL RESPONSES TO PHQ QUESTIONS 1-9: 3
SUM OF ALL RESPONSES TO PHQ QUESTIONS 1-9: 3
10. IF YOU CHECKED OFF ANY PROBLEMS, HOW DIFFICULT HAVE THESE PROBLEMS MADE IT FOR YOU TO DO YOUR WORK, TAKE CARE OF THINGS AT HOME, OR GET ALONG WITH OTHER PEOPLE: NOT DIFFICULT AT ALL

## 2023-12-26 ASSESSMENT — PAIN SCALES - GENERAL: PAINLEVEL: NO PAIN (0)

## 2023-12-26 NOTE — PROGRESS NOTES
Concerns: recovered from COVID   Doing well.  No concerns today.  Discussed anenatal screening.  She accepts testing at this time.  Reportable signs and symptoms discussed.  Will schedule anatomy ultrasound.  RTC 4 weeks.      Tylor Mederos MD, MD    Answers submitted by the patient for this visit:  Patient Health Questionnaire (Submitted on 12/26/2023)  If you checked off any problems, how difficult have these problems made it for you to do your work, take care of things at home, or get along with other people?: Not difficult at all  PHQ9 TOTAL SCORE: 3

## 2024-01-02 LAB — SCANNED LAB RESULT: NORMAL

## 2024-02-05 ENCOUNTER — PRENATAL OFFICE VISIT (OUTPATIENT)
Dept: FAMILY MEDICINE | Facility: CLINIC | Age: 34
End: 2024-02-05
Payer: COMMERCIAL

## 2024-02-05 VITALS
SYSTOLIC BLOOD PRESSURE: 110 MMHG | OXYGEN SATURATION: 97 % | TEMPERATURE: 97 F | HEIGHT: 69 IN | HEART RATE: 94 BPM | BODY MASS INDEX: 23.25 KG/M2 | RESPIRATION RATE: 18 BRPM | WEIGHT: 157 LBS | DIASTOLIC BLOOD PRESSURE: 58 MMHG

## 2024-02-05 DIAGNOSIS — Z34.02 ENCOUNTER FOR SUPERVISION OF NORMAL FIRST PREGNANCY IN SECOND TRIMESTER: Primary | ICD-10-CM

## 2024-02-05 PROCEDURE — 99207 PR PRENATAL VISIT: CPT | Performed by: FAMILY MEDICINE

## 2024-02-05 ASSESSMENT — PAIN SCALES - GENERAL: PAINLEVEL: NO PAIN (0)

## 2024-02-05 NOTE — PROGRESS NOTES
Concerns today: none   No nausea/vomiting. No heartburn.  No vaginal bleeding, no contractions/severe cramping, no leakage of fluid.  No vaginal discharge. No dysuria  No headache, vision changes, lower extremity swelling, upper abdominal pain, chest pain, shortness of breath.   Mood has been good       Tylor Mederos MD, MD

## 2024-02-09 ENCOUNTER — HOSPITAL ENCOUNTER (OUTPATIENT)
Dept: ULTRASOUND IMAGING | Facility: CLINIC | Age: 34
Discharge: HOME | End: 2024-02-09
Attending: FAMILY MEDICINE | Admitting: FAMILY MEDICINE
Payer: COMMERCIAL

## 2024-02-09 DIAGNOSIS — Z34.02 ENCOUNTER FOR SUPERVISION OF NORMAL FIRST PREGNANCY IN SECOND TRIMESTER: ICD-10-CM

## 2024-02-09 PROCEDURE — 76805 OB US >/= 14 WKS SNGL FETUS: CPT

## 2024-02-20 ENCOUNTER — PRENATAL OFFICE VISIT (OUTPATIENT)
Dept: FAMILY MEDICINE | Facility: CLINIC | Age: 34
End: 2024-02-20
Payer: COMMERCIAL

## 2024-02-20 VITALS
DIASTOLIC BLOOD PRESSURE: 68 MMHG | BODY MASS INDEX: 23.4 KG/M2 | WEIGHT: 158 LBS | TEMPERATURE: 97.3 F | RESPIRATION RATE: 18 BRPM | HEIGHT: 69 IN | HEART RATE: 107 BPM | OXYGEN SATURATION: 99 % | SYSTOLIC BLOOD PRESSURE: 102 MMHG

## 2024-02-20 DIAGNOSIS — Z34.02 ENCOUNTER FOR SUPERVISION OF NORMAL FIRST PREGNANCY IN SECOND TRIMESTER: Primary | ICD-10-CM

## 2024-02-20 PROCEDURE — 99207 PR PRENATAL VISIT: CPT | Performed by: FAMILY MEDICINE

## 2024-02-20 ASSESSMENT — PAIN SCALES - GENERAL: PAINLEVEL: NO PAIN (0)

## 2024-02-20 NOTE — PROGRESS NOTES
Concerns: none   No vaginal bleeding, no contractions, no leakage of fluid  No nausea/vomiting. No heartburn  No vaginal discharge. No dysuria.   No headache, vision changes, lower extremity swelling, upper abdominal pain, chest pain, shortness of breath  Tdap planned next visit  Discussed PTL, PROM, and when to call or come in.  Normal anatomy ultrasound.  RTC 4 weeks.  Setting up her one hour later this week       Tylor Mederos MD, MD

## 2024-02-21 ENCOUNTER — MYC MEDICAL ADVICE (OUTPATIENT)
Dept: FAMILY MEDICINE | Facility: CLINIC | Age: 34
End: 2024-02-21

## 2024-02-21 ENCOUNTER — LAB (OUTPATIENT)
Dept: LAB | Facility: CLINIC | Age: 34
End: 2024-02-21
Payer: COMMERCIAL

## 2024-02-21 DIAGNOSIS — Z34.02 ENCOUNTER FOR SUPERVISION OF NORMAL FIRST PREGNANCY IN SECOND TRIMESTER: ICD-10-CM

## 2024-02-21 DIAGNOSIS — R73.09 ELEVATED GLUCOSE: Primary | ICD-10-CM

## 2024-02-21 LAB — GLUCOSE 1H P 50 G GLC PO SERPL-MCNC: 136 MG/DL (ref 70–129)

## 2024-02-21 PROCEDURE — 36415 COLL VENOUS BLD VENIPUNCTURE: CPT

## 2024-02-21 PROCEDURE — 82950 GLUCOSE TEST: CPT

## 2024-02-22 ENCOUNTER — LAB (OUTPATIENT)
Dept: LAB | Facility: CLINIC | Age: 34
End: 2024-02-22
Payer: COMMERCIAL

## 2024-02-22 DIAGNOSIS — R73.09 ELEVATED GLUCOSE: ICD-10-CM

## 2024-02-22 LAB
GESTATIONAL GTT 1 HR POST DOSE: 137 MG/DL (ref 60–179)
GESTATIONAL GTT 2 HR POST DOSE: 122 MG/DL (ref 60–154)
GESTATIONAL GTT 3 HR POST DOSE: 61 MG/DL (ref 60–139)
GLUCOSE P FAST SERPL-MCNC: 83 MG/DL (ref 60–94)

## 2024-02-22 PROCEDURE — 82951 GLUCOSE TOLERANCE TEST (GTT): CPT

## 2024-02-22 PROCEDURE — 82952 GTT-ADDED SAMPLES: CPT

## 2024-02-22 PROCEDURE — 36415 COLL VENOUS BLD VENIPUNCTURE: CPT

## 2024-03-02 ENCOUNTER — MYC MEDICAL ADVICE (OUTPATIENT)
Dept: FAMILY MEDICINE | Facility: CLINIC | Age: 34
End: 2024-03-02
Payer: COMMERCIAL

## 2024-03-05 ENCOUNTER — VIRTUAL VISIT (OUTPATIENT)
Dept: FAMILY MEDICINE | Facility: CLINIC | Age: 34
End: 2024-03-05
Payer: COMMERCIAL

## 2024-03-05 DIAGNOSIS — Z33.1 PREGNANCY, INCIDENTAL: ICD-10-CM

## 2024-03-05 DIAGNOSIS — K04.7 TOOTH ABSCESS: Primary | ICD-10-CM

## 2024-03-05 PROCEDURE — 99442 PR PHYSICIAN TELEPHONE EVALUATION 11-20 MIN: CPT | Mod: 95 | Performed by: FAMILY MEDICINE

## 2024-03-05 RX ORDER — HYDROCODONE BITARTRATE AND ACETAMINOPHEN 5; 325 MG/1; MG/1
1 TABLET ORAL 3 TIMES DAILY PRN
Qty: 10 TABLET | Refills: 0 | Status: SHIPPED | OUTPATIENT
Start: 2024-03-05 | End: 2024-03-26

## 2024-03-05 NOTE — PROGRESS NOTES
Mecca is a 34 year old who is being evaluated via a billable video visit.      How would you like to obtain your AVS? MyChart  If the video visit is dropped, the invitation should be resent by: Text to cell phone: [phone]  Will anyone else be joining your video visit? No          Assessment & Plan       ICD-10-CM    1. Tooth abscess  K04.7 amoxicillin-clavulanate (AUGMENTIN) 875-125 MG tablet     HYDROcodone-acetaminophen (NORCO) 5-325 MG tablet      2. Pregnancy, incidental  Z33.1          Unable to connect the video visit, telephone visit conducted instead.  Clinical presentation was suggestive of dental infections if not abscess.  Report of right facial swelling over the area of her dental infection.  Pain is severe.  She is about 26-week pregnant.  Not able to see a dentist for another month.  Has similar problem in the past and responded to amoxicillin.    Unable to exam due to virtual visit.    Will treat her empirically with Augmentin for 10-day.  She was also given small amount of Norco to be taken as needed for severe pain.  Risk and benefit discussed, especially with her pregnancy.  Continue with Tylenol as needed for pain.  Encouraged to drink a lot of water.    Follow-up with a dentist as scheduled.  Follow-up with her PCP for prenatal care as scheduled, earlier as needed    Should follow-up in person if the symptoms persist, worsening of the facial swelling, develop severe headache, CP/SOB or if she has any concern.    17 minutes spent by me on the date of the encounter doing chart review, history and exam, documentation and further activities per the note    Total telephone time: 10 minutes  Started time: 3:30 PM  Ended time: 3:40 PM      Nicotine/Tobacco Cessation  She reports that she has been smoking. She has never used smokeless tobacco.  Nicotine/Tobacco Cessation Plan  Did not address at this visit        Subjective   Mecca is a 34 year old, presenting for the following health  issues:    Consult (Abscess Tooth - 26 weeks pregnant, unable to get into dentist until next month Left Side )        3/5/2024     1:47 PM   Additional Questions   Roomed by Elida SAL   Accompanied by Self     Via the Health Maintenance questionnaire, the patient has reported the following services have been completed -Cervical Cancer Screening, this information has been sent to the abstraction team.  History of Present Illness       Reason for visit:  Absessed tooth  Symptom onset:  3-7 days ago  Symptoms include:  Swollen face throbbing pain  Symptom intensity:  Severe  Symptom progression:  Staying the same  Had these symptoms before:  Yes  Has tried/received treatment for these symptoms:  Yes  Previous treatment was successful:  Yes  Prior treatment description:  Root canals  What makes it worse:  Antibiotics  What makes it better:  Tylenol    She eats 0-1 servings of fruits and vegetables daily.She consumes 3 sweetened beverage(s) daily.She exercises with enough effort to increase her heart rate 9 or less minutes per day.  She exercises with enough effort to increase her heart rate 3 or less days per week.   She is taking medications regularly.     Mecca was seen today for dental abscess.  Been having severe throbbing pain on the left upper tooth.  Been there for a week and it is getting worse.  She believes that she has an abscess. Throbbing pain, not relieved with Tylenol.  She is 26 weeks pregnant.  No fever or chills.  No headache.  Has had it in the past and responded to the amoxicillin.  Her left face over the infection area is now swollen.  No trouble with swallowing.  No chest pain or shortness of breath.  No fever or chills.  Not able to get into to see her dentist for another month.  Eating and drinking well.  No concern for pregnancy.  No other concern.      Review of Systems  Constitutional, HEENT, cardiovascular, pulmonary, gi and gu systems are negative, except as otherwise noted.      Objective            Vitals:  No vitals were obtained today due to virtual visit.    Physical Exam   GENERAL: alert and no distress, speaking full sentences.    RESP: No audible wheeze, cough, or labored breathing.    NEURO: Mentation and speech appropriate for age.  PSYCH: Appropriate affect, tone, and pace of words    No results found for any visits on 03/05/24.      Video-Visit Details    Type of service: Telephone visit    Originating Location (pt. Location): Home    Distant Location (provider location):  On-site  Platform used for Video Visit: Telephone  Signed Electronically by: Ryan Oquendo Mai, MD

## 2024-03-13 ENCOUNTER — TELEPHONE (OUTPATIENT)
Dept: FAMILY MEDICINE | Facility: CLINIC | Age: 34
End: 2024-03-13
Payer: COMMERCIAL

## 2024-03-13 ENCOUNTER — MYC MEDICAL ADVICE (OUTPATIENT)
Dept: FAMILY MEDICINE | Facility: CLINIC | Age: 34
End: 2024-03-13
Payer: COMMERCIAL

## 2024-03-13 NOTE — TELEPHONE ENCOUNTER
Patient is having a dental procedure that requires Xray and a form is being faxed to Dr Mederos to 'clear' the patient for Xray during the procedure.  Patient is PREGNANT    Procedure is TODAY 05-    Sondra uY XRO/

## 2024-03-16 ENCOUNTER — NURSE TRIAGE (OUTPATIENT)
Dept: NURSING | Facility: CLINIC | Age: 34
End: 2024-03-16
Payer: COMMERCIAL

## 2024-03-16 NOTE — TELEPHONE ENCOUNTER
Nurse Triage SBAR    Is this a 2nd Level Triage? NO    Situation: vaginal itching    Background: Patient is 27w4d pregnant    Assessment: Patient is calling stating that she has been having vaginal irritation, itching, and burning for the past 2 days.  Patient states that she has some dry skin in between her vagina and rectum.    Protocol Recommended Disposition:   Home Care    Recommendation: Home care advice given per protocol.  Recommend trying over the counter antifungal medicines.  If symptoms do not improve, please call FNA back for further advice.  Discussed Kick Counts and how to perform.  Patient verbalized understanding the information.         Does the patient meet one of the following criteria for ADS visit consideration? 16+ years old, with an [facility] PCP     TIP  Providers, please consider if this condition is appropriate for management at one of our Acute and Diagnostic Services sites.     If patient is a good candidate, please use dotphrase <dot>triageresponse and select Refer to ADS to document.        Reason for Disposition   Symptoms of a vaginal yeast infection (i.e., white, thick, cottage-cheese-like, itchy, not bad smelling discharge)    Additional Information   Negative: [1] Pregnant 23 or more weeks AND [2] baby is moving less today (e.g., kick count < 5 in 1 hour or < 10 in 2 hours)   Negative: Patient sounds very sick or weak to the triager   Negative: [1] Constant abdominal pain AND [2] present > 2 hours   Negative: [1] Intermittent lower abdominal pain AND [2] present > 24 hours   Negative: [1] Pregnant 24-36 weeks () AND [2] pinkish or brownish mucous discharge   Negative: [1] Yellow or green vaginal discharge AND [2] fever   Negative: Painful rash with tiny water blisters   Negative: [1] Rash (e.g., redness, tiny bumps, sore) of genital area AND [2] present > 24 hours   Negative: Abnormal color vaginal discharge (i.e., yellow, green, gray)   Negative: Bad smelling vaginal  "discharge   Negative: Tender lump (swelling or \"ball\") at vaginal opening   Negative: [1] Symptoms of a \"yeast infection\" (i.e., itchy, white discharge, not bad smelling) AND [2] not improved > 3 days following Care Advice   Negative: Patient is worried they have a sexually transmitted infection (STI)   Negative: Pain with sexual intercourse (dyspareunia)   Negative: [1] Pregnant 37 or more weeks (term) AND [2] pinkish or brownish mucous discharge   Negative: [1] Pregnant 37 or more weeks (term) AND [2] passed a small glob or chunk of mucous (may look like gelatin or snot)   Negative: [1] Rash (e.g., redness, tiny bumps, sore) of genital area AND [2] present < 24 hours    Protocols used: Pregnancy - Vaginal Aihipptia-I-CS    "

## 2024-03-25 LAB
ABO/RH(D): NORMAL
ANTIBODY SCREEN: NEGATIVE
SPECIMEN EXPIRATION DATE: NORMAL

## 2024-03-26 ENCOUNTER — PRENATAL OFFICE VISIT (OUTPATIENT)
Dept: FAMILY MEDICINE | Facility: CLINIC | Age: 34
End: 2024-03-26
Payer: COMMERCIAL

## 2024-03-26 ENCOUNTER — MYC MEDICAL ADVICE (OUTPATIENT)
Dept: FAMILY MEDICINE | Facility: CLINIC | Age: 34
End: 2024-03-26

## 2024-03-26 VITALS
HEIGHT: 69 IN | TEMPERATURE: 97.8 F | OXYGEN SATURATION: 98 % | SYSTOLIC BLOOD PRESSURE: 116 MMHG | HEART RATE: 100 BPM | RESPIRATION RATE: 16 BRPM | WEIGHT: 174.4 LBS | BODY MASS INDEX: 25.83 KG/M2 | DIASTOLIC BLOOD PRESSURE: 66 MMHG

## 2024-03-26 DIAGNOSIS — Z32.02 PREGNANCY EXAMINATION OR TEST, NEGATIVE RESULT: Primary | ICD-10-CM

## 2024-03-26 DIAGNOSIS — Z34.02 ENCOUNTER FOR SUPERVISION OF NORMAL FIRST PREGNANCY IN SECOND TRIMESTER: ICD-10-CM

## 2024-03-26 PROCEDURE — 90471 IMMUNIZATION ADMIN: CPT | Performed by: FAMILY MEDICINE

## 2024-03-26 PROCEDURE — 96372 THER/PROPH/DIAG INJ SC/IM: CPT | Performed by: FAMILY MEDICINE

## 2024-03-26 PROCEDURE — 86901 BLOOD TYPING SEROLOGIC RH(D): CPT | Performed by: FAMILY MEDICINE

## 2024-03-26 PROCEDURE — 86900 BLOOD TYPING SEROLOGIC ABO: CPT | Performed by: FAMILY MEDICINE

## 2024-03-26 PROCEDURE — 36415 COLL VENOUS BLD VENIPUNCTURE: CPT | Performed by: FAMILY MEDICINE

## 2024-03-26 PROCEDURE — 87086 URINE CULTURE/COLONY COUNT: CPT

## 2024-03-26 PROCEDURE — 99207 PR PRENATAL VISIT: CPT | Performed by: FAMILY MEDICINE

## 2024-03-26 PROCEDURE — 86850 RBC ANTIBODY SCREEN: CPT | Performed by: FAMILY MEDICINE

## 2024-03-26 PROCEDURE — 90715 TDAP VACCINE 7 YRS/> IM: CPT | Performed by: FAMILY MEDICINE

## 2024-03-26 ASSESSMENT — PAIN SCALES - GENERAL: PAINLEVEL: NO PAIN (0)

## 2024-03-26 NOTE — TELEPHONE ENCOUNTER
[employer] message sent to patient to obtain more information to fill out the form.     Laurita Randolph LPN

## 2024-03-26 NOTE — NURSING NOTE
Prior to immunization administration, verified patients identity using patient s name and date of birth. Please see Immunization Activity for additional information.     Screening Questionnaire for Adult Immunization    Are you sick today?   No   Do you have allergies to medications, food, a vaccine component or latex?   No   Have you ever had a serious reaction after receiving a vaccination?   No   Do you have a long-term health problem with heart, lung, kidney, or metabolic disease (e.g., diabetes), asthma, a blood disorder, no spleen, complement component deficiency, a cochlear implant, or a spinal fluid leak?  Are you on long-term aspirin therapy?   No   Do you have cancer, leukemia, HIV/AIDS, or any other immune system problem?   No   Do you have a parent, brother, or sister with an immune system problem?   No   In the past 3 months, have you taken medications that affect  your immune system, such as prednisone, other steroids, or anticancer drugs; drugs for the treatment of rheumatoid arthritis, Crohn s disease, or psoriasis; or have you had radiation treatments?   No   Have you had a seizure, or a brain or other nervous system problem?   No   During the past year, have you received a transfusion of blood or blood    products, or been given immune (gamma) globulin or antiviral drug?   No   For women: Are you pregnant or is there a chance you could become       pregnant during the next month?   Yes   Have you received any vaccinations in the past 4 weeks?   No     Immunization questionnaire was positive for at least one answer.  Notified Dr. Mederos.      Patient instructed to remain in clinic for 15 minutes afterwards, and to report any adverse reactions.     Screening performed by Evon Whaley MA on 3/26/2024 at 8:50 AM.

## 2024-03-26 NOTE — PROGRESS NOTES
Concerns: Experiencing acid reflux onset 2 weeks ago, hasn't tried anything for it yet. Yeast infection cleared up with OTC cream. No urinary symptoms or abnormal discharge. Recent root canal for tooth abscess, doing well now with no new concerns regarding her teeth.   No nausea or vomiting.   No vaginal bleeding, LOF.  No contractions. Some mild cramping.   Rhogam given today.  DTAP given today.  Continue prenatal vitamin.   Discussed starting with Tums to help with the acid reflux.   Discussed kick counts and fetal movement.  Discussed PTL, PROM, and when to call or come in.  RTC 2 weeks.      Arpita Dockery, MS3    I was present with the medical student who participated in the service and in the documentation of the note. I have verified the history and personally performed the physical exam and medical decision making. I reviewed the note in detail and edited it appropriately. I agree with the assessment and plan of care as documented in the note.     Tylor Mederos MD

## 2024-03-26 NOTE — NURSING NOTE
Clinic Administered Medication Documentation        Patient was given RhoGam. Prior to medication administration, verified patient's identity using patient s name and date of birth. Please see MAR and medication order for additional information. Patient instructed to remain in clinic for 15 minutes and report any adverse reaction to staff immediately.    Vial/Syringe: Jp Whaley MA

## 2024-03-27 LAB — BACTERIA UR CULT: NORMAL

## 2024-04-04 ENCOUNTER — PRENATAL OFFICE VISIT (OUTPATIENT)
Dept: FAMILY MEDICINE | Facility: CLINIC | Age: 34
End: 2024-04-04
Payer: COMMERCIAL

## 2024-04-04 VITALS
SYSTOLIC BLOOD PRESSURE: 104 MMHG | WEIGHT: 173 LBS | DIASTOLIC BLOOD PRESSURE: 72 MMHG | BODY MASS INDEX: 25.62 KG/M2 | HEART RATE: 109 BPM | HEIGHT: 69 IN | RESPIRATION RATE: 18 BRPM | TEMPERATURE: 97.4 F | OXYGEN SATURATION: 98 %

## 2024-04-04 DIAGNOSIS — Z34.03 ENCOUNTER FOR SUPERVISION OF NORMAL FIRST PREGNANCY IN THIRD TRIMESTER: Primary | ICD-10-CM

## 2024-04-04 PROCEDURE — 99207 PR PRENATAL VISIT: CPT | Performed by: FAMILY MEDICINE

## 2024-04-04 ASSESSMENT — PAIN SCALES - GENERAL: PAINLEVEL: MILD PAIN (3)

## 2024-04-04 NOTE — PROGRESS NOTES
Concerns: none   Doing well.  No concerns today.  No vaginal bleeding, LOF.  No contractions.  Discussed kick counts and fetal movement.  Reportable signs and symptoms discussed.  Discussed kick counts and fetal movement.  Discussed PTL, PROM, and when to call or come in.  Filled out [facility] paperwork but according to the patient they have been very inappropriate with their questioning of her office visits and the reasons for her missing work.  I did remind her that all of her health visits are protected by HIPAA and they do not have the right to specific information in regards to her health care and she may want to point that out to them.  She has been told this by numerous other family members that are in the healthcare field also.  RTC 2 weeks.    Tylor Mederos MD

## 2024-04-10 ENCOUNTER — TELEPHONE (OUTPATIENT)
Dept: FAMILY MEDICINE | Facility: CLINIC | Age: 34
End: 2024-04-10
Payer: COMMERCIAL

## 2024-04-10 NOTE — TELEPHONE ENCOUNTER
Spoke with the patient an updated/ verified registration information for upcoming appointment. Pt thought her appointment was at a different time and will need to reschedule. Pt states she will log onto mychart and reschedule appt.       RADHA Silverio

## 2024-04-16 ENCOUNTER — PRENATAL OFFICE VISIT (OUTPATIENT)
Dept: FAMILY MEDICINE | Facility: CLINIC | Age: 34
End: 2024-04-16
Payer: COMMERCIAL

## 2024-04-16 VITALS
OXYGEN SATURATION: 97 % | TEMPERATURE: 98 F | WEIGHT: 178.2 LBS | HEIGHT: 71 IN | DIASTOLIC BLOOD PRESSURE: 78 MMHG | SYSTOLIC BLOOD PRESSURE: 128 MMHG | HEART RATE: 104 BPM | BODY MASS INDEX: 24.95 KG/M2 | RESPIRATION RATE: 21 BRPM

## 2024-04-16 DIAGNOSIS — Z34.03 ENCOUNTER FOR SUPERVISION OF NORMAL FIRST PREGNANCY IN THIRD TRIMESTER: Primary | ICD-10-CM

## 2024-04-16 PROCEDURE — 99207 PR PRENATAL VISIT: CPT | Performed by: FAMILY MEDICINE

## 2024-04-16 NOTE — PATIENT INSTRUCTIONS
"Weeks 32 to 34 of Your Pregnancy: Care Instructions    Decide whether you want to bank or donate your baby's umbilical cord blood. If you want to save this blood, you have to arrange for it ahead of time.    Decide about circumcision. Personal, Temple, or cultural beliefs may play a role in your decision. You get to decide what you want for your baby.    Learn how to ease hemorrhoids.    Get more liquids, fruits, vegetables, and fiber in your diet.  Avoid sitting for too long.  Clean yourself with moist toilet paper. Or try witch hazel pads.  Try ice packs or warm sitz baths for discomfort.  Use hydrocortisone cream for pain or itching.  Ask your doctor about stool softeners.    Consider the benefits of breastfeeding.    It reduces your baby's risk of sudden infant death syndrome (SIDS).   babies are less likely to get certain infections. And they're less likely to be obese or get diabetes later in life.  It can lower your risk of breast and ovarian cancers and osteoporosis.  It saves you money.  Follow-up care is a key part of your treatment and safety. Be sure to make and go to all appointments, and call your doctor if you are having problems. It's also a good idea to know your test results and keep a list of the medicines you take.  Where can you learn more?  Go to https://www.m2fx.net/patiented  Enter X711 in the search box to learn more about \"Weeks 32 to 34 of Your Pregnancy: Care Instructions.\"  Current as of: July 10, 2023               Content Version: 14.0    9550-9363 i.Sec.   Care instructions adapted under license by your healthcare professional. If you have questions about a medical condition or this instruction, always ask your healthcare professional. Health[employer], [employer] disclaims any warranty or liability for your use of this information.      "

## 2024-04-16 NOTE — PROGRESS NOTES
Concerns: none   Doing well.  No concerns today.  No vaginal bleeding, LOF.  No contractions.  Discussed kick counts and fetal movement.  Reportable signs and symptoms discussed.  Discussed kick counts and fetal movement.  Discussed PTL, PROM, and when to call or come in.  RTC 2 weeks.    Tylor Mederos MD, MD

## 2024-04-17 ENCOUNTER — MYC MEDICAL ADVICE (OUTPATIENT)
Dept: FAMILY MEDICINE | Facility: CLINIC | Age: 34
End: 2024-04-17
Payer: COMMERCIAL

## 2024-05-01 ENCOUNTER — PRENATAL OFFICE VISIT (OUTPATIENT)
Dept: FAMILY MEDICINE | Facility: CLINIC | Age: 34
End: 2024-05-01
Payer: COMMERCIAL

## 2024-05-01 VITALS
OXYGEN SATURATION: 99 % | HEIGHT: 70 IN | WEIGHT: 183 LBS | DIASTOLIC BLOOD PRESSURE: 72 MMHG | SYSTOLIC BLOOD PRESSURE: 112 MMHG | BODY MASS INDEX: 26.2 KG/M2 | HEART RATE: 103 BPM | TEMPERATURE: 97.8 F

## 2024-05-01 DIAGNOSIS — O26.893 RH NEGATIVE STATE IN ANTEPARTUM PERIOD, THIRD TRIMESTER: ICD-10-CM

## 2024-05-01 DIAGNOSIS — O99.320 MARIJUANA USE DURING PREGNANCY: ICD-10-CM

## 2024-05-01 DIAGNOSIS — F12.90 MARIJUANA USE DURING PREGNANCY: ICD-10-CM

## 2024-05-01 DIAGNOSIS — Z67.91 RH NEGATIVE STATE IN ANTEPARTUM PERIOD, THIRD TRIMESTER: ICD-10-CM

## 2024-05-01 DIAGNOSIS — Z3A.34 34 WEEKS GESTATION OF PREGNANCY: ICD-10-CM

## 2024-05-01 DIAGNOSIS — Z34.03 ENCOUNTER FOR SUPERVISION OF NORMAL FIRST PREGNANCY IN THIRD TRIMESTER: Primary | ICD-10-CM

## 2024-05-01 PROCEDURE — 99207 PR PRENATAL VISIT: CPT | Performed by: STUDENT IN AN ORGANIZED HEALTH CARE EDUCATION/TRAINING PROGRAM

## 2024-05-01 ASSESSMENT — PATIENT HEALTH QUESTIONNAIRE - PHQ9
SUM OF ALL RESPONSES TO PHQ QUESTIONS 1-9: 4
10. IF YOU CHECKED OFF ANY PROBLEMS, HOW DIFFICULT HAVE THESE PROBLEMS MADE IT FOR YOU TO DO YOUR WORK, TAKE CARE OF THINGS AT HOME, OR GET ALONG WITH OTHER PEOPLE: NOT DIFFICULT AT ALL
SUM OF ALL RESPONSES TO PHQ QUESTIONS 1-9: 4

## 2024-05-01 ASSESSMENT — PAIN SCALES - GENERAL: PAINLEVEL: NO PAIN (0)

## 2024-05-01 NOTE — PROGRESS NOTES
" @ 34w1d here for OB visit.   No LOF, or VB. Baby active.   Discussed THC use, revisited discussion to use edible > inhaled.  Some BH, no ctx. Will plan for SVE next visit.  Prenatal Labs:   Lab Results   Component Value Date    AS Negative 2024    HEPBANG Nonreactive 10/30/2023    CHPCRT Negative 2023    GCPCRT Negative 2023    HGB 12.3 12/10/2023       /72   Pulse 103   Temp 97.8  F (36.6  C) (Temporal)   Ht 1.778 m (5' 10\")   Wt 83 kg (183 lb)   LMP 2023   SpO2 99%   BMI 26.26 kg/m     Gen - well appearing  See flowsheet  Pulse improved on exam    A/P  Doing well.  No concerns today.  Some Marshall cooney so plan for Cervix check next visit.  GBS next visit.  Prenatal flowsheet information is reviewed.  Discussed indications, risks, complications and failure rate of inductions.  Discussed signs of labor and when to call or come in.  Discussed kick counts and fetal movement.  Encouraged to quit smoking.  Reportable signs and symptoms discussed.   Discussed signs of labor    RTC 1 wk    Pregnancy Issues   1. Nullip  2. THC use - switching to edibles only per recommendations          Prenatal care plan              - prior deliveries: none  - OB Labs of concern: none   - Rh- O NEG, Received Rhogam              - Labor pain management:  yes please              - Ped:  will consider and decide later              - Circumcision if boy: Yes              - Birth control: not at present              - Breast feeding:  Maybe (THC)              - Covid 19 vaccine:  Not Done              - influenza vaccine: Not Done              - Tdap Done    Answers submitted by the patient for this visit:  Patient Health Questionnaire (Submitted on 2024)  If you checked off any problems, how difficult have these problems made it for you to do your work, take care of things at home, or get along with other people?: Not difficult at all  PHQ9 TOTAL SCORE: 4    "

## 2024-05-09 ENCOUNTER — NURSE TRIAGE (OUTPATIENT)
Dept: FAMILY MEDICINE | Facility: CLINIC | Age: 34
End: 2024-05-09
Payer: COMMERCIAL

## 2024-05-09 ENCOUNTER — HOSPITAL ENCOUNTER (EMERGENCY)
Facility: CLINIC | Age: 34
Discharge: HOME | End: 2024-05-09
Attending: EMERGENCY MEDICINE | Admitting: FAMILY MEDICINE
Payer: COMMERCIAL

## 2024-05-09 ENCOUNTER — APPOINTMENT (OUTPATIENT)
Dept: ULTRASOUND IMAGING | Facility: CLINIC | Age: 34
End: 2024-05-09
Attending: EMERGENCY MEDICINE
Payer: COMMERCIAL

## 2024-05-09 VITALS
DIASTOLIC BLOOD PRESSURE: 73 MMHG | BODY MASS INDEX: 25.52 KG/M2 | HEIGHT: 71 IN | RESPIRATION RATE: 20 BRPM | TEMPERATURE: 97.8 F | OXYGEN SATURATION: 98 % | SYSTOLIC BLOOD PRESSURE: 131 MMHG | HEART RATE: 86 BPM

## 2024-05-09 DIAGNOSIS — M79.89 LEG SWELLING: ICD-10-CM

## 2024-05-09 DIAGNOSIS — M79.662 PAIN OF LEFT LOWER LEG: ICD-10-CM

## 2024-05-09 PROCEDURE — 93971 EXTREMITY STUDY: CPT | Mod: LT

## 2024-05-09 PROCEDURE — 99283 EMERGENCY DEPT VISIT LOW MDM: CPT | Performed by: EMERGENCY MEDICINE

## 2024-05-09 PROCEDURE — 99284 EMERGENCY DEPT VISIT MOD MDM: CPT | Mod: 25 | Performed by: FAMILY MEDICINE

## 2024-05-09 ASSESSMENT — COLUMBIA-SUICIDE SEVERITY RATING SCALE - C-SSRS
6. HAVE YOU EVER DONE ANYTHING, STARTED TO DO ANYTHING, OR PREPARED TO DO ANYTHING TO END YOUR LIFE?: NO
1. IN THE PAST MONTH, HAVE YOU WISHED YOU WERE DEAD OR WISHED YOU COULD GO TO SLEEP AND NOT WAKE UP?: NO
2. HAVE YOU ACTUALLY HAD ANY THOUGHTS OF KILLING YOURSELF IN THE PAST MONTH?: NO

## 2024-05-09 ASSESSMENT — ACTIVITIES OF DAILY LIVING (ADL)
ADLS_ACUITY_SCORE: 33
ADLS_ACUITY_SCORE: 35

## 2024-05-09 NOTE — ED PROVIDER NOTES
"  History     Chief Complaint   Patient presents with    Leg Pain     HPI  Mecca Bills is a 34 year old female who is currently pregnant, 35 weeks and 2 days, presents with left leg pain and swelling.  2 days ago she woke from sleep with pain in her left calf, described as a \"charley horse\".  Said the pain has never completely gone away and continues to be bothersome.  She is also noting swelling in her left leg more so than her right.  Says that she only notices swelling in her right ankle but feels that her whole left leg is swollen.  Denies any chest pain or shortness of breath.  No previous history of blood clots.  No family history of bleeding or clotting disorders.  She is still feeling baby move, denies any abdominal pain, cramping, vaginal bleeding or spotting.  She talked to a nurse and they advised her to come in to be evaluated for possible blood clot    Allergies:  No Known Allergies    Problem List:    Patient Active Problem List    Diagnosis Date Noted    Current mild episode of major depressive disorder (H24) 10/09/2020     Priority: Medium    Generalized anxiety disorder 06/22/2015     Priority: Medium     Diagnosis updated by automated process. Provider to review and confirm.      Major depression, recurrent (H24) 06/22/2015     Priority: Medium        Past Medical History:    Past Medical History:   Diagnosis Date    History of anxiety     History of chlamydia     History of infertility        Past Surgical History:    Past Surgical History:   Procedure Laterality Date    WISDOM TOOTH EXTRACTION         Family History:    Family History   Problem Relation Age of Onset    Skin Cancer Mother     Pacemaker Father     No Known Problems Sister     No Known Problems Sister     Diabetes Maternal Grandmother     No Known Problems Maternal Grandfather     No Known Problems Paternal Grandmother     No Known Problems Paternal Grandfather     Breast Cancer No family hx of        Social History:  Marital " "Status:   [2]  Social History     Tobacco Use    Smoking status: Every Day     Types: Vaping Device    Smokeless tobacco: Never   Vaping Use    Vaping status: Every Day   Substance Use Topics    Alcohol use: Yes    Drug use: Yes     Types: Marijuana     Comment: occasional        Medications:    No current outpatient medications on file.        Review of Systems   All other systems reviewed and are negative.      Physical Exam   BP: 130/74  Pulse: 86  Temp: 97.8  F (36.6  C)  Resp: 20  Height: 180.3 cm (5' 11\")  SpO2: 100 %      Physical Exam  Vitals and nursing note reviewed.   Constitutional:       General: She is not in acute distress.  Pulmonary:      Effort: Pulmonary effort is normal.   Musculoskeletal:      Left lower leg: Edema present.      Comments: Trace right ankle edema, has greater left-sided lower extremity edema.  Posterior calf tenderness on the left side   Skin:     General: Skin is warm.      Capillary Refill: Capillary refill takes less than 2 seconds.      Findings: No erythema.   Neurological:      Mental Status: She is alert.   Psychiatric:         Mood and Affect: Mood normal.         Behavior: Behavior normal.         ED Course        Procedures              Critical Care time:  none               Results for orders placed or performed during the hospital encounter of 05/09/24 (from the past 24 hour(s))   US Lower Extremity Venous Duplex Left    Narrative    EXAM: US LOWER EXTREMITY VENOUS DUPLEX LEFT  LOCATION: [facility]  DATE: 5/9/2024    INDICATION: Left leg swelling and calf pain   COMPARISON: None.  TECHNIQUE: Venous Duplex ultrasound of the left lower extremity with and without compression, augmentation and duplex. Color flow and spectral Doppler with waveform analysis performed.    FINDINGS: Exam includes the common femoral, femoral, popliteal, and contralateral common femoral veins as well as segmentally visualized deep calf veins and greater " saphenous vein.     LEFT: No deep vein thrombosis. No superficial thrombophlebitis. No popliteal cyst.      Impression    IMPRESSION:  No deep venous thrombosis in the left lower extremity.       Medications - No data to display    Assessments & Plan (with Medical Decision Making)  Mecca is a 34-year-old G1, P0 female who is currently pregnant at 35 weeks and 2 days presenting with left leg pain and swelling, concern for possible blood clot.  See history and physical exam as above  Pleasant, nontoxic-appearing 34-year-old female in no acute distress, is vitally stable and afebrile.  She does have swelling of bilateral ankles, but left appears greater than right.  She also has posterior calf tenderness on the left side.  She is denying any chest pain or shortness of breath, oxygen saturation at 100% on room air.  Is not tachycardic.  Will plan to get an ultrasound of her leg to rule out blood clot versus superficial thrombophlebitis.  Offered dose of Tylenol while waiting in the ED since she was complaining of leg pain, but she declined at this time  Ultrasound result as above.  Negative for DVT or superficial thrombophlebitis, no evidence of popliteal cyst.  Patient was reassured with these normal findings.  Told that she can take Tylenol, use ice or heat, gentle massage or stretching, and elevating legs.  Should follow-up with her primary provider and OB/GYN.  Return to the ED if worsening symptoms develop.  All questions answered and discharged in stable condition     I have reviewed the nursing notes.    I have reviewed the findings, diagnosis, plan and need for follow up with the patient.           Medical Decision Making  The patient's presentation was of low complexity (an acute and uncomplicated illness or injury).    The patient's evaluation involved:  ordering and/or review of 1 test(s) in this encounter (see separate area of note for details)    The patient's management necessitated only low risk  treatment.        New Prescriptions    No medications on file       Final diagnoses:   Pain of left lower leg   Leg swelling       5/9/2024   [facility] EMERGENCY DEPT       Lori Kay DO  05/09/24 1934

## 2024-05-09 NOTE — ED TRIAGE NOTES
"Pt reports she woke to a \"gianluca horse\" in her left leg on Tuesday. States she has been having pain in her left lower leg since and also states the swelling in her legs is worse in the left then the right. Pt is 35 weeks pregnant.      Triage Assessment (Adult)       Row Name 05/09/24 9475          Triage Assessment    Airway WDL WDL        Respiratory WDL    Respiratory WDL WDL        Skin Circulation/Temperature WDL    Skin Circulation/Temperature WDL WDL                     "

## 2024-05-09 NOTE — TELEPHONE ENCOUNTER
Nurse Triage SBAR    Is this a 2nd Level Triage? NO    Situation: Patient called in with concerns about leg swelling. She is concerned about a blood clot or preeclampsia.     Background: Patient states she is 35 weeks pregnant. She states she has had swollen feet and ankles, but reports a significant increase in swelling of the left leg.     Assessment: Patient states she has swelling of both lower legs, ankles and feet. She states she woke up from a gianluca horse in her left calf Monday morning, and this calf continues to be sore/painful. She states she is  limping a little bit. She states that her left leg now is much more swollen than her right leg. She states 2 days ago she started wearing compression socks. She states she has some abdominal pain here and there, says she thinks it is aye cooney contractions, and some pain in her ribs when baby is kicking her. She denies having any of these pains now. She states yesterday both of her hands were a little bit swollen, but this is gone now. She states there was some pain to the swelling yesterday, but this is better now. She denies any current pain to the swelling. She states yesterday when she took her boots off after work she had some red marks on her ankles, but they were gone when she woke up this morning. She denies any difficulty breathing, chest pain, vision changes, headache, abdominal pain, fever.    Protocol Recommended Disposition:   Go To Office Now    Recommendation: Per protocol, patient should go to office now. Advised patient to go to ED as we are nearing end of day in clinic and no appointments available. She requested an appointment for tomorrow, but no appointments in [place]. Offered to look for an appointment in [place], but patient states she will just go to the ED tonight, to be on the safe side. She had no further questions or concerns.     DELISA Holm, RN         Reason for Disposition   Thigh or calf pain and only 1 side and  present > 1 hour    Additional Information   Negative: SEVERE difficulty breathing (e.g., struggling for each breath, speaks in single words)   Negative: Sounds like a life-threatening emergency to the triager   Negative: Ankle swelling from an ankle injury   Negative: Followed an insect bite and has localized swelling (e.g., small area of puffy or swollen skin)   Negative: SEVERE leg swelling (e.g., swelling extends above knee, entire leg is swollen)   Negative: Chest pain   Negative: Difficulty breathing of new-onset or worsening   Negative: Pregnant 20 or more weeks AND blurred vision or visual changes   Negative: Pregnant 20 or more weeks and upper abdominal pain lasting > 1 hour   Negative: Pregnant 20 or more weeks AND severe headache and not relieved with acetaminophen (e.g., Tylenol)   Negative: Pregnant 23 or more weeks and baby is moving less today (e.g., kick count < 5 in 1 hour or < 10 in 2 hours)   Negative: Pregnant 20 or more weeks AND swelling of face, arm or hands  (Exception: Slight puffiness of fingers.)   Negative: Red area or streak > 2 inches (or 5 cm)   Negative: Red area or streak and fever   Negative: Swelling is painful to touch AND fever   Negative: Cast on leg or ankle AND now increased pain   Negative: Patient sounds very sick or weak to the triager   Negative: Pregnant 20 or more weeks and sudden weight gain (i.e., more than 3 lbs or 1.4 kg in one week)   Negative: Pregnant 20 or more weeks and Systolic BP >= 140 OR Diastolic BP >= 90    Protocols used: Pregnancy - Leg Swelling and Edema-A-OH

## 2024-05-10 NOTE — DISCHARGE INSTRUCTIONS
Your ultrasound did not show evidence of a blood clot.  The swelling in your legs may be due to pregnancy.  Pain may be muscular in nature    It is okay to use ice or heat, massage, and Tylenol per bottle instructions as needed for any symptoms    Follow-up with your primary doctor 1 to 2 weeks    If you develop any new or worsening symptoms do not hesitate to return to the emergency room for evaluation

## 2024-05-15 ENCOUNTER — PRENATAL OFFICE VISIT (OUTPATIENT)
Dept: FAMILY MEDICINE | Facility: CLINIC | Age: 34
End: 2024-05-15
Payer: COMMERCIAL

## 2024-05-15 VITALS
RESPIRATION RATE: 18 BRPM | TEMPERATURE: 97.6 F | BODY MASS INDEX: 25.76 KG/M2 | WEIGHT: 184 LBS | HEART RATE: 94 BPM | SYSTOLIC BLOOD PRESSURE: 128 MMHG | OXYGEN SATURATION: 99 % | HEIGHT: 71 IN | DIASTOLIC BLOOD PRESSURE: 70 MMHG

## 2024-05-15 DIAGNOSIS — F12.90 MARIJUANA USE DURING PREGNANCY: ICD-10-CM

## 2024-05-15 DIAGNOSIS — M79.662 PAIN OF LEFT LOWER LEG: ICD-10-CM

## 2024-05-15 DIAGNOSIS — Z34.03 ENCOUNTER FOR SUPERVISION OF NORMAL FIRST PREGNANCY IN THIRD TRIMESTER: Primary | ICD-10-CM

## 2024-05-15 DIAGNOSIS — Z67.91 RH NEGATIVE STATE IN ANTEPARTUM PERIOD, THIRD TRIMESTER: ICD-10-CM

## 2024-05-15 DIAGNOSIS — O99.320 MARIJUANA USE DURING PREGNANCY: ICD-10-CM

## 2024-05-15 DIAGNOSIS — O26.893 RH NEGATIVE STATE IN ANTEPARTUM PERIOD, THIRD TRIMESTER: ICD-10-CM

## 2024-05-15 PROCEDURE — 99207 PR PRENATAL VISIT: CPT | Performed by: STUDENT IN AN ORGANIZED HEALTH CARE EDUCATION/TRAINING PROGRAM

## 2024-05-15 PROCEDURE — 87653 STREP B DNA AMP PROBE: CPT | Performed by: STUDENT IN AN ORGANIZED HEALTH CARE EDUCATION/TRAINING PROGRAM

## 2024-05-15 ASSESSMENT — PAIN SCALES - GENERAL: PAINLEVEL: NO PAIN (0)

## 2024-05-15 NOTE — PROGRESS NOTES
"Mecca Bills 34 year old  @ 36w1d with an 2024 by 2024, by Last Menstrual Period here for OB visit.  No LOF or VB, baby active.  Patient needs [facility] paperwork updated in a couple spots per work request.  Was an issue with request being dated 1 week prior to her due date.  Was advised that could just make date there MARCOS and they would adjust the dates to earlier if she delivers beforehand since she has no complications to indicate need for earlier start.  This was done during visit and will be faxed by staff.   Discussed ED visit for leg swelling. Evaluation was normal and ruled out concern for clots. Was advised of typical interventions per ED and today rlated to pregnancy associated LE swelling. Trace edema noted today.  Occasional BH, no other complaints or concerns.    /70   Pulse 94   Temp 97.6  F (36.4  C) (Temporal)   Resp 18   Ht 1.803 m (5' 11\")   Wt 83.5 kg (184 lb)   LMP 2023   SpO2 99%   BMI 25.66 kg/m     Gen - well appearing  See flowsheet    A/P   Doing well.  No concerns today.  Still some Rajiv cooney. SVE today closed/thick/high/soft/posterio   GBS today.  Corrected requested changes on [facility] paperwork.  Advised of standard practice for pregnancy associated BERHANE including compression stockings, elevating feet in evening, avoiding sitting too long, etc.  Prenatal flowsheet information is reviewed.  Discussed indications, risks, complications and failure rate of inductions.  Discussed signs of labor and when to call or come in.  Discussed kick counts and fetal movement.  Encouraged to quit smoking.  Reportable signs and symptoms discussed.   Discussed signs of labor  RTC 1 wk or sooner if problems     Pregnancy Issues              1. Nullip  2. THC use - switching to edibles only per recommendations                Prenatal care plan              - prior deliveries: none  - OB Labs of concern: none              - Rh- O NEG, Received Rhogam 24              - " Labor pain management:  yes please              - Ped:  will consider and decide later              - Circumcision if boy: Yes              - Birth control: not at present              - Breast feeding:  Maybe (THC)              - Covid 19 vaccine:  Not Done              - influenza vaccine: Not Done              - Tdap Done

## 2024-05-16 LAB — GP B STREP DNA SPEC QL NAA+PROBE: NEGATIVE

## 2024-05-22 ENCOUNTER — PRENATAL OFFICE VISIT (OUTPATIENT)
Dept: FAMILY MEDICINE | Facility: CLINIC | Age: 34
End: 2024-05-22
Payer: COMMERCIAL

## 2024-05-22 VITALS
HEART RATE: 99 BPM | DIASTOLIC BLOOD PRESSURE: 72 MMHG | WEIGHT: 183 LBS | OXYGEN SATURATION: 99 % | BODY MASS INDEX: 25.62 KG/M2 | HEIGHT: 71 IN | SYSTOLIC BLOOD PRESSURE: 120 MMHG | TEMPERATURE: 97.9 F

## 2024-05-22 DIAGNOSIS — Z67.91 RH NEGATIVE STATE IN ANTEPARTUM PERIOD, THIRD TRIMESTER: ICD-10-CM

## 2024-05-22 DIAGNOSIS — Z34.03 ENCOUNTER FOR SUPERVISION OF NORMAL FIRST PREGNANCY IN THIRD TRIMESTER: Primary | ICD-10-CM

## 2024-05-22 DIAGNOSIS — F12.90 MARIJUANA USE DURING PREGNANCY: ICD-10-CM

## 2024-05-22 DIAGNOSIS — O26.893 RH NEGATIVE STATE IN ANTEPARTUM PERIOD, THIRD TRIMESTER: ICD-10-CM

## 2024-05-22 DIAGNOSIS — O99.320 MARIJUANA USE DURING PREGNANCY: ICD-10-CM

## 2024-05-22 PROCEDURE — 99207 PR PRENATAL VISIT: CPT | Performed by: STUDENT IN AN ORGANIZED HEALTH CARE EDUCATION/TRAINING PROGRAM

## 2024-05-22 ASSESSMENT — PAIN SCALES - GENERAL: PAINLEVEL: NO PAIN (0)

## 2024-05-22 NOTE — PROGRESS NOTES
Mecca Bills 34 year old  @ 37w1d with an Estimated Date of Delivery: 2024, by Last Menstrual Period here for OB visit.  No bleeding or LOF.   BH, no ctx. No concerns.     Prenatal Labs:   Lab Results   Component Value Date    AS Negative 2024    HEPBANG Nonreactive 10/30/2023    CHPCRT Negative 2023    GCPCRT Negative 2023    HGB 12.3 12/10/2023          LMP 2023    See flowsheet    A/P   Doing well.  No concerns today.  Some [place] cooney.   SVE closed/high/soft/mid  GBS NEG.  Prenatal flowsheet information is reviewed.  Discussed indications, risks, complications and failure rate of inductions.  Discussed signs of labor and when to call or come in.  Discussed kick counts and fetal movement.  Encouraged to quit smoking.  Reportable signs and symptoms discussed.   Discussed signs of labor  RTC 1 wk or sooner if problems     Pregnancy Issues              1. Nullip  2. THC use - switching to edibles only per recommendations                Prenatal care plan              - prior deliveries: none  - OB Labs of concern: none              - Rh- O NEG, Received Rhogam 24              - Labor pain management:  yes please              - Ped:  will consider and decide later              - Circumcision if boy: Yes              - Birth control: not at present              - Breast feeding:  Maybe (THC)              - Covid 19 vaccine:  Not Done              - influenza vaccine: Not Done              - Tdap Done

## 2024-05-29 ENCOUNTER — PRENATAL OFFICE VISIT (OUTPATIENT)
Dept: FAMILY MEDICINE | Facility: CLINIC | Age: 34
End: 2024-05-29
Payer: COMMERCIAL

## 2024-05-29 VITALS
DIASTOLIC BLOOD PRESSURE: 76 MMHG | SYSTOLIC BLOOD PRESSURE: 124 MMHG | WEIGHT: 184 LBS | HEART RATE: 95 BPM | HEIGHT: 71 IN | BODY MASS INDEX: 25.76 KG/M2 | OXYGEN SATURATION: 99 % | TEMPERATURE: 97.9 F

## 2024-05-29 DIAGNOSIS — Z67.91 RH NEGATIVE STATE IN ANTEPARTUM PERIOD, THIRD TRIMESTER: ICD-10-CM

## 2024-05-29 DIAGNOSIS — F12.90 MARIJUANA USE DURING PREGNANCY: ICD-10-CM

## 2024-05-29 DIAGNOSIS — O26.893 RH NEGATIVE STATE IN ANTEPARTUM PERIOD, THIRD TRIMESTER: ICD-10-CM

## 2024-05-29 DIAGNOSIS — Z34.03 ENCOUNTER FOR SUPERVISION OF NORMAL FIRST PREGNANCY IN THIRD TRIMESTER: Primary | ICD-10-CM

## 2024-05-29 DIAGNOSIS — O99.320 MARIJUANA USE DURING PREGNANCY: ICD-10-CM

## 2024-05-29 PROCEDURE — 99207 PR PRENATAL VISIT: CPT | Performed by: STUDENT IN AN ORGANIZED HEALTH CARE EDUCATION/TRAINING PROGRAM

## 2024-05-29 NOTE — PROGRESS NOTES
"Mecca Bills 34 year old  @ 38w1d with an Estimated Date of Delivery: 2024, by Last Menstrual Period here for OB visit.  No bleeding or LOF.     Prenatal Labs:   Lab Results   Component Value Date    AS Negative 2024    HEPBANG Nonreactive 10/30/2023    CHPCRT Negative 2023    GCPCRT Negative 2023    HGB 12.3 12/10/2023        /76   Pulse 95   Temp 97.9  F (36.6  C) (Temporal)   Ht 1.803 m (5' 11\")   Wt 83.5 kg (184 lb)   LMP 2023   SpO2 99%   BMI 25.66 kg/m     See flowsheet for cervical exam, FH, FHT, position  Membranes stripped: No    A/P   Doing well.  No concerns today.  [place] cooney.   Cervix is posterior, long, closed and soft.  Cephalic position confirmed by Leopold maneuvers and cervical exam.  Discussed indications, risks, complications and failure rate of inductions.  Prenatal flowsheet information is reviewed.  Discussed signs of labor and when to call or come in.  Discussed kick counts and fetal movement.  Encouraged to quit smoking.  Reportable signs and symptoms discussed.   Discussed signs of labor.  GBS NEG.  RTC 1 wk or sooner if problems     Pregnancy Issues              1. Nullip  2. THC use - switching to edibles only per recommendations                Prenatal care plan              - prior deliveries: none  - OB Labs of concern: none              - Rh- O NEG, Received Rhogam 24              - Labor pain management:  yes please              - Ped:  Dr. Ulrich, desires pediatrician              - Circumcision if boy: Yes              - Birth control: not at present              - Breast feeding:  Maybe (THC)              - Covid 19 vaccine:  Not Done              - influenza vaccine: Not Done              - Tdap Done        "

## 2024-06-05 ENCOUNTER — PRENATAL OFFICE VISIT (OUTPATIENT)
Dept: FAMILY MEDICINE | Facility: CLINIC | Age: 34
End: 2024-06-05
Payer: COMMERCIAL

## 2024-06-05 VITALS
OXYGEN SATURATION: 98 % | BODY MASS INDEX: 26.04 KG/M2 | DIASTOLIC BLOOD PRESSURE: 74 MMHG | HEART RATE: 103 BPM | WEIGHT: 186 LBS | SYSTOLIC BLOOD PRESSURE: 126 MMHG | TEMPERATURE: 97.5 F | HEIGHT: 71 IN

## 2024-06-05 DIAGNOSIS — Z34.03 ENCOUNTER FOR SUPERVISION OF NORMAL FIRST PREGNANCY IN THIRD TRIMESTER: Primary | ICD-10-CM

## 2024-06-05 DIAGNOSIS — O99.320 MARIJUANA USE DURING PREGNANCY: ICD-10-CM

## 2024-06-05 DIAGNOSIS — F12.90 MARIJUANA USE DURING PREGNANCY: ICD-10-CM

## 2024-06-05 DIAGNOSIS — O26.893 RH NEGATIVE STATE IN ANTEPARTUM PERIOD, THIRD TRIMESTER: ICD-10-CM

## 2024-06-05 DIAGNOSIS — Z67.91 RH NEGATIVE STATE IN ANTEPARTUM PERIOD, THIRD TRIMESTER: ICD-10-CM

## 2024-06-05 PROCEDURE — 99207 PR PRENATAL VISIT: CPT | Performed by: STUDENT IN AN ORGANIZED HEALTH CARE EDUCATION/TRAINING PROGRAM

## 2024-06-05 NOTE — PROGRESS NOTES
"Mecca Bills 34 year old  @ 39w1d with an Estimated Date of Delivery: 2024, by Last Menstrual Period here for OB visit.  No bleeding or LOF.   No concerns. Maybe some BH, though not sure.     Prenatal Labs:   Lab Results   Component Value Date    AS Negative 2024    HEPBANG Nonreactive 10/30/2023    CHPCRT Negative 2023    GCPCRT Negative 2023    HGB 12.3 12/10/2023        /74   Pulse 103   Temp 97.5  F (36.4  C) (Temporal)   Ht 1.803 m (5' 11\")   Wt 84.4 kg (186 lb)   LMP 2023   SpO2 98%   BMI 25.94 kg/m     See flowsheet for SVE  Membranes stripped? No  Pulse wnl on exam.    A/P   Doing well.  No concerns today.  Maybe some [place] cooney.   Cervix is posterior, long, closed and soft.  Signs of labor discussed  GBS NEG  We discussed the r/b associated with IOL, especially for post dates. Membrane stripping not possible as cervix closed, next visit discuss IOL date if looking like post dates will occur.  RTC 1 wk or sooner if problems    Pregnancy Issues              1. Nullip  2. THC use - edibles only per recommendations                Prenatal care plan              - prior deliveries: none  - OB Labs of concern: none              - Rh - O NEG, Received Rhogam 24              - Labor pain management:  yes please              - Ped:  Dr. Ulrich, desires pediatrician              - Circumcision if boy: Yes              - Birth control: not at present              - Breast feeding:  Maybe (THC)              - Covid 19 vaccine:  Not Done              - influenza vaccine: Not Done              - Tdap Done    "

## 2024-06-12 ENCOUNTER — PRENATAL OFFICE VISIT (OUTPATIENT)
Dept: FAMILY MEDICINE | Facility: CLINIC | Age: 34
End: 2024-06-12
Payer: COMMERCIAL

## 2024-06-12 VITALS
BODY MASS INDEX: 25.76 KG/M2 | OXYGEN SATURATION: 99 % | HEART RATE: 102 BPM | WEIGHT: 184 LBS | DIASTOLIC BLOOD PRESSURE: 70 MMHG | TEMPERATURE: 97.2 F | SYSTOLIC BLOOD PRESSURE: 118 MMHG | HEIGHT: 71 IN | RESPIRATION RATE: 18 BRPM

## 2024-06-12 DIAGNOSIS — O09.519 SUPERVISION OF HIGH-RISK PREGNANCY OF ELDERLY PRIMIGRAVIDA: Primary | ICD-10-CM

## 2024-06-12 PROCEDURE — 99207 PR COMPLICATED OB VISIT: CPT | Performed by: FAMILY MEDICINE

## 2024-06-12 ASSESSMENT — PAIN SCALES - GENERAL: PAINLEVEL: NO PAIN (0)

## 2024-06-14 PROBLEM — O09.519 SUPERVISION OF HIGH-RISK PREGNANCY OF ELDERLY PRIMIGRAVIDA: Status: ACTIVE | Noted: 2024-06-14

## 2024-06-14 RX ORDER — CITRIC ACID/SODIUM CITRATE 334-500MG
30 SOLUTION, ORAL ORAL
Status: CANCELLED | OUTPATIENT
Start: 2024-06-14

## 2024-06-14 RX ORDER — METOCLOPRAMIDE 5 MG/1
10 TABLET ORAL EVERY 6 HOURS PRN
Status: CANCELLED | OUTPATIENT
Start: 2024-06-14

## 2024-06-14 RX ORDER — OXYTOCIN/0.9 % SODIUM CHLORIDE 30/500 ML
100-340 PLASTIC BAG, INJECTION (ML) INTRAVENOUS CONTINUOUS PRN
Status: CANCELLED | OUTPATIENT
Start: 2024-06-14

## 2024-06-14 RX ORDER — ACETAMINOPHEN 325 MG/1
650 TABLET ORAL EVERY 4 HOURS PRN
Status: CANCELLED | OUTPATIENT
Start: 2024-06-14

## 2024-06-14 RX ORDER — LIDOCAINE 40 MG/G
CREAM TOPICAL
Status: CANCELLED | OUTPATIENT
Start: 2024-06-14

## 2024-06-14 RX ORDER — TRANEXAMIC ACID 10 MG/ML
1 INJECTION, SOLUTION INTRAVENOUS EVERY 30 MIN PRN
Status: CANCELLED | OUTPATIENT
Start: 2024-06-14

## 2024-06-14 RX ORDER — OXYTOCIN/0.9 % SODIUM CHLORIDE 30/500 ML
340 PLASTIC BAG, INJECTION (ML) INTRAVENOUS CONTINUOUS PRN
Status: CANCELLED | OUTPATIENT
Start: 2024-06-14

## 2024-06-14 RX ORDER — ONDANSETRON 2 MG/ML
4 INJECTION INTRAMUSCULAR; INTRAVENOUS EVERY 6 HOURS PRN
Status: CANCELLED | OUTPATIENT
Start: 2024-06-14

## 2024-06-14 RX ORDER — PROCHLORPERAZINE 25 MG
25 SUPPOSITORY, RECTAL RECTAL EVERY 12 HOURS PRN
Status: CANCELLED | OUTPATIENT
Start: 2024-06-14

## 2024-06-14 RX ORDER — OXYTOCIN 10 [USP'U]/ML
10 INJECTION, SOLUTION INTRAMUSCULAR; INTRAVENOUS
Status: CANCELLED | OUTPATIENT
Start: 2024-06-14

## 2024-06-14 RX ORDER — NALOXONE HYDROCHLORIDE 0.4 MG/ML
0.4 INJECTION, SOLUTION INTRAMUSCULAR; INTRAVENOUS; SUBCUTANEOUS
Status: CANCELLED | OUTPATIENT
Start: 2024-06-14

## 2024-06-14 RX ORDER — IBUPROFEN 200 MG
800 TABLET ORAL
Status: CANCELLED | OUTPATIENT
Start: 2024-06-14

## 2024-06-14 RX ORDER — MISOPROSTOL 100 UG/1
25 TABLET ORAL EVERY 4 HOURS PRN
Status: CANCELLED | OUTPATIENT
Start: 2024-06-14

## 2024-06-14 RX ORDER — MORPHINE SULFATE 10 MG/ML
10 INJECTION, SOLUTION INTRAMUSCULAR; INTRAVENOUS
Status: CANCELLED | OUTPATIENT
Start: 2024-06-14

## 2024-06-14 RX ORDER — PROCHLORPERAZINE MALEATE 5 MG
10 TABLET ORAL EVERY 6 HOURS PRN
Status: CANCELLED | OUTPATIENT
Start: 2024-06-14

## 2024-06-14 RX ORDER — MISOPROSTOL 200 UG/1
400 TABLET ORAL
Status: CANCELLED | OUTPATIENT
Start: 2024-06-14

## 2024-06-14 RX ORDER — LOPERAMIDE HCL 2 MG
4 CAPSULE ORAL
Status: CANCELLED | OUTPATIENT
Start: 2024-06-14

## 2024-06-14 RX ORDER — HYDROXYZINE HYDROCHLORIDE 50 MG/1
50 TABLET, FILM COATED ORAL
Status: CANCELLED | OUTPATIENT
Start: 2024-06-14

## 2024-06-14 RX ORDER — NALOXONE HYDROCHLORIDE 0.4 MG/ML
0.2 INJECTION, SOLUTION INTRAMUSCULAR; INTRAVENOUS; SUBCUTANEOUS
Status: CANCELLED | OUTPATIENT
Start: 2024-06-14

## 2024-06-14 RX ORDER — METHYLERGONOVINE MALEATE 0.2 MG/ML
200 INJECTION INTRAVENOUS
Status: CANCELLED | OUTPATIENT
Start: 2024-06-14

## 2024-06-14 RX ORDER — CARBOPROST TROMETHAMINE 250 UG/ML
250 INJECTION, SOLUTION INTRAMUSCULAR
Status: CANCELLED | OUTPATIENT
Start: 2024-06-14

## 2024-06-14 RX ORDER — KETOROLAC TROMETHAMINE 30 MG/ML
30 INJECTION, SOLUTION INTRAMUSCULAR; INTRAVENOUS
Status: CANCELLED | OUTPATIENT
Start: 2024-06-14

## 2024-06-14 RX ORDER — ONDANSETRON 4 MG/1
4 TABLET, ORALLY DISINTEGRATING ORAL EVERY 6 HOURS PRN
Status: CANCELLED | OUTPATIENT
Start: 2024-06-14

## 2024-06-14 RX ORDER — METOCLOPRAMIDE HYDROCHLORIDE 5 MG/ML
10 INJECTION INTRAMUSCULAR; INTRAVENOUS EVERY 6 HOURS PRN
Status: CANCELLED | OUTPATIENT
Start: 2024-06-14

## 2024-06-14 RX ORDER — FENTANYL CITRATE 50 UG/ML
100 INJECTION, SOLUTION INTRAMUSCULAR; INTRAVENOUS
Status: CANCELLED | OUTPATIENT
Start: 2024-06-14

## 2024-06-14 RX ORDER — LOPERAMIDE HCL 2 MG
2 CAPSULE ORAL
Status: CANCELLED | OUTPATIENT
Start: 2024-06-14

## 2024-06-14 NOTE — PROGRESS NOTES
Doing well overall.  No bleeding, no regular ctx.   Just some BH. Feels like baby dropped.   On exam, baby still very high.   Discussed delivery, induction. Discussed baby meds, OK with all.   Having a boy, planning circ.   Discussed her desire to breastfeed. She has a history of THC use and is concerned about social implications of this and also breastfeeding. She is concerned about losing her baby. I advised her that THC is legal, but not recommended in pregnancy.   She hasn't used it now for 1 week or more and I advised her to abstain for remainder of pregnancy.  We discussed effects on baby and withdrawal risk for tobacco as well as other drugs including THC and others. She is not using any other drugs.   Advised that baby will be tested and may be positive even if she abstains for remainder of pregnancy.    Advised that  will likely be called but that doesn't mean she'll lose custody of baby, we just want to make sure she is providing a safe and drug free environment to raise baby.   She is comfortable with all of this.   Discussed post dates induction. Will set her up for next week depending on schedule, will need cervical ripening the night before. Tentative Monday night into Tuesday.   Discussed when to present for signs and symptoms of labor.   Discussed warning signs for preeclampsia.  Will f/u sooner with any concern for decreased fetal movement, vaginal bleeding, fluid leak, headache, vision change, severe nausea or vomiting, abdominal pain, increased edema or other concerns.   Yodit Lomeli MD

## 2024-06-16 ENCOUNTER — HOSPITAL ENCOUNTER (INPATIENT)
Facility: CLINIC | Age: 34
LOS: 4 days | Discharge: HOME | End: 2024-06-20
Attending: FAMILY MEDICINE | Admitting: FAMILY MEDICINE
Payer: COMMERCIAL

## 2024-06-16 DIAGNOSIS — O09.519 SUPERVISION OF HIGH-RISK PREGNANCY OF ELDERLY PRIMIGRAVIDA: Primary | ICD-10-CM

## 2024-06-16 DIAGNOSIS — O28.8 NON-REACTIVE NST (NON-STRESS TEST): ICD-10-CM

## 2024-06-16 DIAGNOSIS — Z98.891 S/P CESAREAN SECTION: ICD-10-CM

## 2024-06-16 LAB
ABO/RH(D): NORMAL
AMPHETAMINES UR QL SCN: ABNORMAL
ANTIBODY SCREEN: NEGATIVE
BARBITURATES UR QL SCN: ABNORMAL
BENZODIAZ UR QL SCN: ABNORMAL
BZE UR QL SCN: ABNORMAL
CANNABINOIDS UR QL SCN: ABNORMAL
ERYTHROCYTE [DISTWIDTH] IN BLOOD BY AUTOMATED COUNT: 13.8 % (ref 10–15)
FENTANYL UR QL: ABNORMAL
HCT VFR BLD AUTO: 30.2 % (ref 35–47)
HGB BLD-MCNC: 10 G/DL (ref 11.7–15.7)
MCH RBC QN AUTO: 30.4 PG (ref 26.5–33)
MCHC RBC AUTO-ENTMCNC: 33.1 G/DL (ref 31.5–36.5)
MCV RBC AUTO: 92 FL (ref 78–100)
OPIATES UR QL SCN: ABNORMAL
PCP QUAL URINE (ROCHE): ABNORMAL
PLATELET # BLD AUTO: 273 10E3/UL (ref 150–450)
RBC # BLD AUTO: 3.29 10E6/UL (ref 3.8–5.2)
SPECIMEN EXPIRATION DATE: NORMAL
WBC # BLD AUTO: 19.7 10E3/UL (ref 4–11)

## 2024-06-16 PROCEDURE — 120N000001 HC R&B MED SURG/OB

## 2024-06-16 PROCEDURE — 86780 TREPONEMA PALLIDUM: CPT | Performed by: FAMILY MEDICINE

## 2024-06-16 PROCEDURE — 86900 BLOOD TYPING SEROLOGIC ABO: CPT | Performed by: FAMILY MEDICINE

## 2024-06-16 PROCEDURE — 80307 DRUG TEST PRSMV CHEM ANLYZR: CPT | Performed by: FAMILY MEDICINE

## 2024-06-16 PROCEDURE — 250N000013 HC RX MED GY IP 250 OP 250 PS 637: Performed by: FAMILY MEDICINE

## 2024-06-16 PROCEDURE — 85027 COMPLETE CBC AUTOMATED: CPT | Performed by: FAMILY MEDICINE

## 2024-06-16 RX ORDER — TRANEXAMIC ACID 10 MG/ML
1 INJECTION, SOLUTION INTRAVENOUS EVERY 30 MIN PRN
Status: DISCONTINUED | OUTPATIENT
Start: 2024-06-16 | End: 2024-06-18 | Stop reason: HOSPADM

## 2024-06-16 RX ORDER — FENTANYL CITRATE 50 UG/ML
100 INJECTION, SOLUTION INTRAMUSCULAR; INTRAVENOUS
Status: DISCONTINUED | OUTPATIENT
Start: 2024-06-16 | End: 2024-06-18 | Stop reason: HOSPADM

## 2024-06-16 RX ORDER — CITRIC ACID/SODIUM CITRATE 334-500MG
30 SOLUTION, ORAL ORAL
Status: DISCONTINUED | OUTPATIENT
Start: 2024-06-16 | End: 2024-06-18 | Stop reason: HOSPADM

## 2024-06-16 RX ORDER — MORPHINE SULFATE 10 MG/ML
10 INJECTION, SOLUTION INTRAMUSCULAR; INTRAVENOUS
Status: DISCONTINUED | OUTPATIENT
Start: 2024-06-16 | End: 2024-06-18 | Stop reason: HOSPADM

## 2024-06-16 RX ORDER — LIDOCAINE 40 MG/G
CREAM TOPICAL
Status: DISCONTINUED | OUTPATIENT
Start: 2024-06-16 | End: 2024-06-18 | Stop reason: HOSPADM

## 2024-06-16 RX ORDER — PROCHLORPERAZINE MALEATE 5 MG
10 TABLET ORAL EVERY 6 HOURS PRN
Status: DISCONTINUED | OUTPATIENT
Start: 2024-06-16 | End: 2024-06-18 | Stop reason: HOSPADM

## 2024-06-16 RX ORDER — NALOXONE HYDROCHLORIDE 0.4 MG/ML
0.2 INJECTION, SOLUTION INTRAMUSCULAR; INTRAVENOUS; SUBCUTANEOUS
Status: DISCONTINUED | OUTPATIENT
Start: 2024-06-16 | End: 2024-06-18 | Stop reason: HOSPADM

## 2024-06-16 RX ORDER — ONDANSETRON 4 MG/1
4 TABLET, ORALLY DISINTEGRATING ORAL EVERY 6 HOURS PRN
Status: DISCONTINUED | OUTPATIENT
Start: 2024-06-16 | End: 2024-06-18 | Stop reason: HOSPADM

## 2024-06-16 RX ORDER — LOPERAMIDE HCL 2 MG
4 CAPSULE ORAL
Status: DISCONTINUED | OUTPATIENT
Start: 2024-06-16 | End: 2024-06-18 | Stop reason: HOSPADM

## 2024-06-16 RX ORDER — NALOXONE HYDROCHLORIDE 0.4 MG/ML
0.4 INJECTION, SOLUTION INTRAMUSCULAR; INTRAVENOUS; SUBCUTANEOUS
Status: DISCONTINUED | OUTPATIENT
Start: 2024-06-16 | End: 2024-06-18 | Stop reason: HOSPADM

## 2024-06-16 RX ORDER — OXYTOCIN 10 [USP'U]/ML
10 INJECTION, SOLUTION INTRAMUSCULAR; INTRAVENOUS
Status: DISCONTINUED | OUTPATIENT
Start: 2024-06-16 | End: 2024-06-18 | Stop reason: HOSPADM

## 2024-06-16 RX ORDER — MISOPROSTOL 200 UG/1
400 TABLET ORAL
Status: DISCONTINUED | OUTPATIENT
Start: 2024-06-16 | End: 2024-06-18 | Stop reason: HOSPADM

## 2024-06-16 RX ORDER — METHYLERGONOVINE MALEATE 0.2 MG/ML
200 INJECTION INTRAVENOUS
Status: DISCONTINUED | OUTPATIENT
Start: 2024-06-16 | End: 2024-06-18 | Stop reason: HOSPADM

## 2024-06-16 RX ORDER — OXYTOCIN/0.9 % SODIUM CHLORIDE 30/500 ML
340 PLASTIC BAG, INJECTION (ML) INTRAVENOUS CONTINUOUS PRN
Status: DISCONTINUED | OUTPATIENT
Start: 2024-06-16 | End: 2024-06-18 | Stop reason: HOSPADM

## 2024-06-16 RX ORDER — ACETAMINOPHEN 325 MG/1
650 TABLET ORAL EVERY 4 HOURS PRN
Status: DISCONTINUED | OUTPATIENT
Start: 2024-06-16 | End: 2024-06-18 | Stop reason: HOSPADM

## 2024-06-16 RX ORDER — LOPERAMIDE HCL 2 MG
2 CAPSULE ORAL
Status: DISCONTINUED | OUTPATIENT
Start: 2024-06-16 | End: 2024-06-18 | Stop reason: HOSPADM

## 2024-06-16 RX ORDER — ONDANSETRON 2 MG/ML
4 INJECTION INTRAMUSCULAR; INTRAVENOUS EVERY 6 HOURS PRN
Status: DISCONTINUED | OUTPATIENT
Start: 2024-06-16 | End: 2024-06-18 | Stop reason: HOSPADM

## 2024-06-16 RX ORDER — MISOPROSTOL 100 UG/1
25 TABLET ORAL EVERY 4 HOURS PRN
Status: DISCONTINUED | OUTPATIENT
Start: 2024-06-16 | End: 2024-06-18 | Stop reason: HOSPADM

## 2024-06-16 RX ORDER — METOCLOPRAMIDE HYDROCHLORIDE 5 MG/ML
10 INJECTION INTRAMUSCULAR; INTRAVENOUS EVERY 6 HOURS PRN
Status: DISCONTINUED | OUTPATIENT
Start: 2024-06-16 | End: 2024-06-18 | Stop reason: HOSPADM

## 2024-06-16 RX ORDER — CARBOPROST TROMETHAMINE 250 UG/ML
250 INJECTION, SOLUTION INTRAMUSCULAR
Status: DISCONTINUED | OUTPATIENT
Start: 2024-06-16 | End: 2024-06-18 | Stop reason: HOSPADM

## 2024-06-16 RX ORDER — HYDROXYZINE HYDROCHLORIDE 50 MG/1
50 TABLET, FILM COATED ORAL
Status: DISCONTINUED | OUTPATIENT
Start: 2024-06-16 | End: 2024-06-18 | Stop reason: HOSPADM

## 2024-06-16 RX ORDER — PROCHLORPERAZINE 25 MG
25 SUPPOSITORY, RECTAL RECTAL EVERY 12 HOURS PRN
Status: DISCONTINUED | OUTPATIENT
Start: 2024-06-16 | End: 2024-06-18 | Stop reason: HOSPADM

## 2024-06-16 RX ORDER — METOCLOPRAMIDE 5 MG/1
10 TABLET ORAL EVERY 6 HOURS PRN
Status: DISCONTINUED | OUTPATIENT
Start: 2024-06-16 | End: 2024-06-18 | Stop reason: HOSPADM

## 2024-06-16 RX ADMIN — MISOPROSTOL 25 MCG: 100 TABLET ORAL at 22:44

## 2024-06-16 ASSESSMENT — ACTIVITIES OF DAILY LIVING (ADL)
ADLS_ACUITY_SCORE: 35
ADLS_ACUITY_SCORE: 20

## 2024-06-17 LAB — T PALLIDUM AB SER QL: NONREACTIVE

## 2024-06-17 PROCEDURE — 99221 1ST HOSP IP/OBS SF/LOW 40: CPT | Performed by: FAMILY MEDICINE

## 2024-06-17 PROCEDURE — 59200 INSERT CERVICAL DILATOR: CPT | Performed by: FAMILY MEDICINE

## 2024-06-17 PROCEDURE — 250N000013 HC RX MED GY IP 250 OP 250 PS 637: Performed by: FAMILY MEDICINE

## 2024-06-17 PROCEDURE — 120N000001 HC R&B MED SURG/OB

## 2024-06-17 PROCEDURE — 258N000003 HC RX IP 258 OP 636: Mod: JZ | Performed by: FAMILY MEDICINE

## 2024-06-17 RX ADMIN — DINOPROSTONE 10 MG: 10 INSERT VAGINAL at 22:29

## 2024-06-17 RX ADMIN — MISOPROSTOL 25 MCG: 100 TABLET ORAL at 13:12

## 2024-06-17 RX ADMIN — HYDROXYZINE HYDROCHLORIDE 50 MG: 50 TABLET, FILM COATED ORAL at 23:15

## 2024-06-17 RX ADMIN — MISOPROSTOL 25 MCG: 100 TABLET ORAL at 08:42

## 2024-06-17 RX ADMIN — SODIUM CHLORIDE, POTASSIUM CHLORIDE, SODIUM LACTATE AND CALCIUM CHLORIDE 500 ML: 600; 310; 30; 20 INJECTION, SOLUTION INTRAVENOUS at 08:16

## 2024-06-17 RX ADMIN — MISOPROSTOL 25 MCG: 100 TABLET ORAL at 03:34

## 2024-06-17 RX ADMIN — SODIUM CHLORIDE, POTASSIUM CHLORIDE, SODIUM LACTATE AND CALCIUM CHLORIDE 500 ML: 600; 310; 30; 20 INJECTION, SOLUTION INTRAVENOUS at 18:55

## 2024-06-17 ASSESSMENT — ACTIVITIES OF DAILY LIVING (ADL)
ADLS_ACUITY_SCORE: 20

## 2024-06-17 NOTE — PROGRESS NOTES
Dr Abrahamson called unit for an update. Reviewed patient not feeling contractions/cramps, FHT Cat I has had a couple episodes of Cat II with minimal variability that resolved with position change. Has received 2 doses of cytotec without SVE change. No change in plan of care at this time.

## 2024-06-17 NOTE — PROGRESS NOTES
Cytotec placed at 2244. Patient reports she is not feeling anything currently; did have some cramping upon arrival. Vitals stable. Resting at this time.

## 2024-06-17 NOTE — PROGRESS NOTES
Urine collected for tox screen. Patient consented. Positive for cannabis. SWS order placed. Patient is aware and states she discussed this with Dr. Lomeli in prenatal appointment as well.

## 2024-06-17 NOTE — PROGRESS NOTES
Data: Patient presented to [facility] on  at 1939.  Patient admitted for induction of labor for AMA. Patient is a .  Prenatal record reviewed. Pregnancy has been complicated by  has been complicated by Advanced Maternal Age .  Gestational Age 40w5d. VSS. Fetal movement present. Patient reports rare uterine contractions, denies leaking of vaginal fluid/rupture of membranes, vaginal bleeding, abdominal pain, epigastric or URQ pain. Support person Frankie Upton is present.   Action: Verbal consent for EFM.  Admission assessment completed. Bill of rights reviewed.  Response: Patient verbalized agreement with plan. Will contact Dr Dukes with update and further orders.

## 2024-06-17 NOTE — PROGRESS NOTES
Cytotec placed at 0334. No concerns with strip; minimal variability at times. Patient repositions throughout the night and shows improvement. Accels present. No decels. No ctx picking up/palpating. Patient denies cramping at this time.

## 2024-06-17 NOTE — PROGRESS NOTES
SVE by NORM 1cm, 50%, soft, posterior, -2.  Pete baloon placed by Dr. Lomeli. 75ml baloon inflated. Patient tolerated well. Breathing through contractions. Cytotec held at the 4 hour terrie. Yoselin replaced following procedure for better FHR tracing. FHR category 1.

## 2024-06-17 NOTE — PROGRESS NOTES
Minimal variability was noted, IV fluid bolus was started at 0816 & position was changed. MD was paged to update, awaiting call back. Will monitor & Cris Burt RN was given report to take over care at this time.

## 2024-06-17 NOTE — PROGRESS NOTES
Dr. Lomeli here to see patient. Cvx unchanged. Pt is natalee per monitor every 1-2, but only feels tightenings every 5-10 minutes. Decision made to place Cytotec 4th dose.

## 2024-06-17 NOTE — PROGRESS NOTES
Dr. Lomeli was updated on minimal variability & administering IV fluid bolus. No new orders given.

## 2024-06-17 NOTE — PROGRESS NOTES
Pt still has moments of minimal variability, but in a 30' period, moderate variability is noted along with accelerations.

## 2024-06-17 NOTE — H&P
"  2024    Mecca Bills  [id number]        OB Admit History & Physical      Ms. Bills  is here for induction of labor due to AMA nulliparity. She came in last evening. Received 2 doses of cytotec overnight. Hasn't been feeling much for contractions yet.     Patient's last menstrual period was 2023.   Her Estimated Date of Delivery: 2024, making her 40w6d  wks.      Estimated body mass index is 25.62 kg/m  as calculated from the following:    Height as of 24: 1.805 m (5' 11.06\").    Weight as of 24: 83.5 kg (184 lb).  Her prenatal course has been complicated by AMA, age 34, and history of THC use.    See prenatal for labs.  negative GBBS, Rubella Immune, RH negative, received rhogam    Estimated fetal weight= 7 lb 4 oz     She is a 34 year old   Her OB history:   OB History    Para Term  AB Living   1 0 0 0 0 0   SAB IAB Ectopic Multiple Live Births   0 0 0 0 0      # Outcome Date GA Lbr Roberto/2nd Weight Sex Type Anes PTL Lv   1 Current               Obstetric Comments   EDC 2024 based on LMP.  Parenting with Frankie.            Past Medical History:   Diagnosis Date    History of anxiety     History of chlamydia     History of infertility           Past Surgical History:   Procedure Laterality Date    WISDOM TOOTH EXTRACTION           No current outpatient medications on file.       Allergies: Patient has no known allergies.      REVIEW OF SYSTEMS:  NEUROLOGIC:  Negative  EYES:  Negative  ENT:  Negative  GI:  Negative  BREAST:  Negative  :  Negative  GYN:  Negative  CV:  Negative  PULMONARY:  Negative  MUSCULOSKELETAL:  Negative  PSYCH:  Negative        Social History     Socioeconomic History    Marital status: Single     Spouse name: Frankie    Number of children: 0    Years of education: Not on file    Highest education level: Not on file   Occupational History    Not on file   Tobacco Use    Smoking status: Every Day     Types: Vaping Device    Smokeless " "tobacco: Never   Vaping Use    Vaping status: Every Day   Substance and Sexual Activity    Alcohol use: Yes    Drug use: Yes     Types: Marijuana     Comment: occasional    Sexual activity: Yes     Partners: Male   Other Topics Concern    Parent/sibling w/ CABG, MI or angioplasty before 65F 55M? Not Asked   Social History Narrative    10/2023  Lives in [place] with JUSTYNA Frankie.  Mecca smokes cigarettes \"less than 5 a day\".  No indoor cats/kittens.  No concerns about domestic violence.  Mecca works for [employer] - she is an .       Social Determinants of Health     Financial Resource Strain: Low Risk  (12/26/2023)    Financial Resource Strain     Within the past 12 months, have you or your family members you live with been unable to get utilities (heat, electricity) when it was really needed?: No   Food Insecurity: Low Risk  (12/26/2023)    Food Insecurity     Within the past 12 months, did you worry that your food would run out before you got money to buy more?: No     Within the past 12 months, did the food you bought just not last and you didn t have money to get more?: No   Transportation Needs: Low Risk  (12/26/2023)    Transportation Needs     Within the past 12 months, has lack of transportation kept you from medical appointments, getting your medicines, non-medical meetings or appointments, work, or from getting things that you need?: No   Physical Activity: Not on file   Stress: Not on file   Social Connections: Unknown (2/15/2024)    [facility]    Social Connections     Frequency of Communication with Friends and Family: Not on file   Interpersonal Safety: Low Risk  (6/12/2024)    Interpersonal Safety     Do you feel physically and emotionally safe where you currently live?: Yes     Within the past 12 months, have you been hit, slapped, kicked or otherwise physically hurt by someone?: No     Within the past 12 months, have you been " humiliated or emotionally abused in other ways by your partner or ex-partner?: No   Housing Stability: Low Risk  (12/26/2023)    Housing Stability     Do you have housing? : Yes     Are you worried about losing your housing?: No      Family History   Problem Relation Age of Onset    Skin Cancer Mother     Pacemaker Father     No Known Problems Sister     No Known Problems Sister     Diabetes Maternal Grandmother     No Known Problems Maternal Grandfather     No Known Problems Paternal Grandmother     No Known Problems Paternal Grandfather     Breast Cancer No family hx of          Vitals:    with minimal variability but accels present. She had 1 decel on arrival when IV being placed but otherwise no decels. She is now having contractions every  3-6 min, not feeling them.     Alert Awake in NAD  HEENT grossly normal  Neck: no lymphadenopathy or thryoidomegaly  Lungs clear to auscultation  Back No spinal or CVAT  Heart RRR without murmur  ABD gravid, nontender.   Exam per RN overnight: Pelvic:  no fluid noted, no blood noted  Cervix is 0 cm / 20-30 % effaced at -2 station, posterior  EXT:  no edema or calf tenderness  Neuro:  intact    Assessment:  IUP at 40w6d  here for induction of labor due to AMA and term     Plan:  I did not recheck her at this time, as she will be due for another dose of cytotec in about 1 hour. Anticipate placing that and then resting about 2 hours, then getting up to ambulate. Hopefully be favorable enough to start pitocin by noon. If not we may continue up to 4 more doses of cytotec prn.     Yodit Lomeli MD  Dept of Family Medicine  June 17, 2024

## 2024-06-17 NOTE — PROGRESS NOTES
Doing well. She is starting to feel her ctx as cramps, some low back pressure. Not bad, rates 4/10 but still appears comfortable. She is not feeling them as frequently as they are showing up on monitor, as often as every 1-2 minutes, she is feeling then between every 5-10 minutes.     O;  /83   Temp 97.8  F (36.6  C) (Oral)   Resp 16   LMP 09/05/2023   SpO2 98% ,   Vitals noted.  Patient alert, oriented, and in no acute distress.   FHT still 130s with slightly improved variability, still some accels, no decels.   CVX still posterior and closely. -3 station,     A/P: we discussed her progress and options. She is here for inductoin due to AMA and term.   She has had 3 doses of cytotec. We discusesd cytotec vs cervidil vs pitocin/balloon. Would be difficult to get a balloon in at this time. I am concerned cervidil will cause undue dryness/irrittion and she is tolerating the cytotec well. We agreed on repeat cytotec. Will then check again this afternoon and can continue up to 3 more doses prn.     Yodit Lomeli MD

## 2024-06-17 NOTE — PROGRESS NOTES
Doing well. Had 3rd dose cytotec placed at 8:42, slight delay due to flat strip. Patient given IV fluid bolus with some improvement.   She would like to get up and go for a walk.     O;    Vitals:    06/16/24 2015 06/17/24 0036 06/17/24 0338 06/17/24 0850   BP: 123/68 101/51 127/68 133/83   BP Location: Left arm Right arm Left arm    Patient Position: Semi-Lazo's Semi-Lazo's Semi-Lazo's    Cuff Size: Adult Regular Adult Regular Adult Regular    Resp: 16 16 15 16   Temp: 98.5  F (36.9  C) 98  F (36.7  C) 98.2  F (36.8  C) 97.8  F (36.6  C)   TempSrc: Oral Oral Oral Oral   SpO2: 98% 92% 98%          with minimal variability, some accels, no decels.   She shows ctx vs uterine irritabilty but not feeling anything.     I advised her now after 2 hours since last cytotec she can get up and walk. Ok to go off unit to cafeteria but not to leave building.   Will check her again at 12:45 pm and consider whether more cytotec will be benficial vs balloon/pitocin.   Yodit Lomeli MD

## 2024-06-17 NOTE — PROGRESS NOTES
SVE 0/20-30%/-2/posterior. Patient had a couple episodic/late decels. Improvement with reposition.

## 2024-06-17 NOTE — PROGRESS NOTES
"Doing well. Was due for next dose of cytotec shortly after 5 pm. Still feeling ctx same, feeling about every 4-5 minutes, not strong.     O:   Vitals:    06/17/24 0338 06/17/24 0850 06/17/24 1320 06/17/24 1540   BP: 127/68 133/83 135/72 125/75   BP Location: Left arm      Patient Position: Semi-Lazo's      Cuff Size: Adult Regular      Pulse:   75 88   Resp: 15 16 16 16   Temp: 98.2  F (36.8  C) 97.8  F (36.6  C) 98.1  F (36.7  C) 97.8  F (36.6  C)   TempSrc: Oral Oral Oral Oral   SpO2: 98%   99%      FHT 130s with good varibility and accels.     Ctx appear frequent but she's not feeling most of them.     SVE: 1 cm, posterior, 50%, soft, -2.     I was able to \"hook\" cervix and slowly get finger all the way through and do a sweep, which she tolerated. Afterward was feeling contractions quite a bit more.  We talked about options of repeating 1 more dose of Cytotec, using balloon, using Pitocin, or combination of 2 agents.  I really felt the balloon would be helpful and consider doing balloon and 1 more dose of Cytotec but she was starting to contract a lot more frequently after the stripping.  I was able to place the Pete balloon her permission, to 75 cc.  Her contraction intensity has increased quite a bit as well.  Will hold off on any further Cytotec at this time.  Shortly after balloon placed we lost Yoselin signal so attempting to adjust monitor to get baby back on but shortly before that fetal heart tones were very reassuring.  Will follow closely.  Yodit Lomeli MD   "

## 2024-06-17 NOTE — PROGRESS NOTES
Patient resting throughout the night. Not picking up contractions; pt reporting cramping had stopped overnight. 2 doses of cytotec was given. Pt repositioned to semi fowlers to determine contraction pattern; irregular pattern. Pt currently states that she does not feel much, maybe occasional cramping but nothing uncomfortable. Dr. Lomeli at bedside this morning and plan for another dose of cytotec when due.

## 2024-06-18 ENCOUNTER — ANESTHESIA (OUTPATIENT)
Dept: OBGYN | Facility: CLINIC | Age: 34
End: 2024-06-18

## 2024-06-18 ENCOUNTER — ANESTHESIA EVENT (OUTPATIENT)
Dept: OBGYN | Facility: CLINIC | Age: 34
End: 2024-06-18
Payer: COMMERCIAL

## 2024-06-18 ENCOUNTER — ANESTHESIA EVENT (OUTPATIENT)
Dept: OBGYN | Facility: CLINIC | Age: 34
End: 2024-06-18

## 2024-06-18 ENCOUNTER — ANESTHESIA (OUTPATIENT)
Dept: OBGYN | Facility: CLINIC | Age: 34
End: 2024-06-18
Payer: COMMERCIAL

## 2024-06-18 PROCEDURE — 258N000003 HC RX IP 258 OP 636: Mod: JZ | Performed by: FAMILY MEDICINE

## 2024-06-18 PROCEDURE — 10907ZC DRAINAGE OF AMNIOTIC FLUID, THERAPEUTIC FROM PRODUCTS OF CONCEPTION, VIA NATURAL OR ARTIFICIAL OPENING: ICD-10-PCS | Performed by: FAMILY MEDICINE

## 2024-06-18 PROCEDURE — 250N000011 HC RX IP 250 OP 636: Mod: JZ | Performed by: FAMILY MEDICINE

## 2024-06-18 PROCEDURE — 120N000001 HC R&B MED SURG/OB

## 2024-06-18 PROCEDURE — 250N000011 HC RX IP 250 OP 636: Performed by: FAMILY MEDICINE

## 2024-06-18 PROCEDURE — 360N000076 HC SURGERY LEVEL 3, PER MIN: Performed by: FAMILY MEDICINE

## 2024-06-18 PROCEDURE — 250N000011 HC RX IP 250 OP 636: Mod: JZ | Performed by: NURSE ANESTHETIST, CERTIFIED REGISTERED

## 2024-06-18 PROCEDURE — 258N000003 HC RX IP 258 OP 636: Mod: JZ | Performed by: NURSE ANESTHETIST, CERTIFIED REGISTERED

## 2024-06-18 PROCEDURE — 370N000017 HC ANESTHESIA TECHNICAL FEE, PER MIN: Performed by: FAMILY MEDICINE

## 2024-06-18 PROCEDURE — 272N000001 HC OR GENERAL SUPPLY STERILE: Performed by: FAMILY MEDICINE

## 2024-06-18 PROCEDURE — 250N000009 HC RX 250: Performed by: FAMILY MEDICINE

## 2024-06-18 PROCEDURE — 710N000010 HC RECOVERY PHASE 1, LEVEL 2, PER MIN: Performed by: FAMILY MEDICINE

## 2024-06-18 PROCEDURE — 250N000013 HC RX MED GY IP 250 OP 250 PS 637: Performed by: FAMILY MEDICINE

## 2024-06-18 PROCEDURE — 999N000016 HC STATISTIC ATTENDANCE AT DELIVERY

## 2024-06-18 PROCEDURE — 250N000009 HC RX 250: Performed by: NURSE ANESTHETIST, CERTIFIED REGISTERED

## 2024-06-18 PROCEDURE — C9290 INJ, BUPIVACAINE LIPOSOME: HCPCS | Performed by: NURSE ANESTHETIST, CERTIFIED REGISTERED

## 2024-06-18 PROCEDURE — 59510 CESAREAN DELIVERY: CPT | Performed by: FAMILY MEDICINE

## 2024-06-18 PROCEDURE — 271N000001 HC OR GENERAL SUPPLY NON-STERILE: Performed by: FAMILY MEDICINE

## 2024-06-18 RX ORDER — ACETAMINOPHEN 325 MG/1
975 TABLET ORAL ONCE
Status: DISCONTINUED | OUTPATIENT
Start: 2024-06-18 | End: 2024-06-18

## 2024-06-18 RX ORDER — TRANEXAMIC ACID 10 MG/ML
1 INJECTION, SOLUTION INTRAVENOUS EVERY 30 MIN PRN
Status: DISCONTINUED | OUTPATIENT
Start: 2024-06-18 | End: 2024-06-20 | Stop reason: HOSPADM

## 2024-06-18 RX ORDER — IBUPROFEN 800 MG/1
800 TABLET, FILM COATED ORAL
Status: DISCONTINUED | OUTPATIENT
Start: 2024-06-18 | End: 2024-06-18

## 2024-06-18 RX ORDER — AMOXICILLIN 250 MG
2 CAPSULE ORAL 2 TIMES DAILY
Status: DISCONTINUED | OUTPATIENT
Start: 2024-06-18 | End: 2024-06-20 | Stop reason: HOSPADM

## 2024-06-18 RX ORDER — METHYLERGONOVINE MALEATE 0.2 MG/ML
200 INJECTION INTRAVENOUS
Status: DISCONTINUED | OUTPATIENT
Start: 2024-06-18 | End: 2024-06-18 | Stop reason: HOSPADM

## 2024-06-18 RX ORDER — KETOROLAC TROMETHAMINE 30 MG/ML
INJECTION, SOLUTION INTRAMUSCULAR; INTRAVENOUS PRN
Status: DISCONTINUED | OUTPATIENT
Start: 2024-06-18 | End: 2024-06-18

## 2024-06-18 RX ORDER — CARBOPROST TROMETHAMINE 250 UG/ML
250 INJECTION, SOLUTION INTRAMUSCULAR
Status: DISCONTINUED | OUTPATIENT
Start: 2024-06-18 | End: 2024-06-20 | Stop reason: HOSPADM

## 2024-06-18 RX ORDER — OXYTOCIN/0.9 % SODIUM CHLORIDE 30/500 ML
PLASTIC BAG, INJECTION (ML) INTRAVENOUS CONTINUOUS PRN
Status: DISCONTINUED | OUTPATIENT
Start: 2024-06-18 | End: 2024-06-18

## 2024-06-18 RX ORDER — SODIUM CHLORIDE, SODIUM LACTATE, POTASSIUM CHLORIDE, CALCIUM CHLORIDE 600; 310; 30; 20 MG/100ML; MG/100ML; MG/100ML; MG/100ML
INJECTION, SOLUTION INTRAVENOUS CONTINUOUS
Status: DISCONTINUED | OUTPATIENT
Start: 2024-06-18 | End: 2024-06-18 | Stop reason: HOSPADM

## 2024-06-18 RX ORDER — FENTANYL CITRATE 50 UG/ML
50 INJECTION, SOLUTION INTRAMUSCULAR; INTRAVENOUS EVERY 5 MIN PRN
Status: DISCONTINUED | OUTPATIENT
Start: 2024-06-18 | End: 2024-06-20 | Stop reason: HOSPADM

## 2024-06-18 RX ORDER — BUPIVACAINE HYDROCHLORIDE 7.5 MG/ML
INJECTION, SOLUTION INTRASPINAL
Status: COMPLETED | OUTPATIENT
Start: 2024-06-18 | End: 2024-06-18

## 2024-06-18 RX ORDER — IBUPROFEN 800 MG/1
800 TABLET, FILM COATED ORAL EVERY 6 HOURS
Status: DISCONTINUED | OUTPATIENT
Start: 2024-06-19 | End: 2024-06-20 | Stop reason: HOSPADM

## 2024-06-18 RX ORDER — KETOROLAC TROMETHAMINE 30 MG/ML
30 INJECTION, SOLUTION INTRAMUSCULAR; INTRAVENOUS
Status: DISCONTINUED | OUTPATIENT
Start: 2024-06-18 | End: 2024-06-18

## 2024-06-18 RX ORDER — LIDOCAINE 40 MG/G
CREAM TOPICAL
Status: DISCONTINUED | OUTPATIENT
Start: 2024-06-18 | End: 2024-06-18 | Stop reason: HOSPADM

## 2024-06-18 RX ORDER — METOCLOPRAMIDE HYDROCHLORIDE 5 MG/ML
10 INJECTION INTRAMUSCULAR; INTRAVENOUS EVERY 6 HOURS PRN
Status: DISCONTINUED | OUTPATIENT
Start: 2024-06-18 | End: 2024-06-20 | Stop reason: HOSPADM

## 2024-06-18 RX ORDER — ACETAMINOPHEN 325 MG/1
975 TABLET ORAL EVERY 6 HOURS
Status: DISCONTINUED | OUTPATIENT
Start: 2024-06-18 | End: 2024-06-20 | Stop reason: HOSPADM

## 2024-06-18 RX ORDER — CARBOPROST TROMETHAMINE 250 UG/ML
250 INJECTION, SOLUTION INTRAMUSCULAR
Status: DISCONTINUED | OUTPATIENT
Start: 2024-06-18 | End: 2024-06-18 | Stop reason: HOSPADM

## 2024-06-18 RX ORDER — LOPERAMIDE HCL 2 MG
4 CAPSULE ORAL
Status: DISCONTINUED | OUTPATIENT
Start: 2024-06-18 | End: 2024-06-20 | Stop reason: HOSPADM

## 2024-06-18 RX ORDER — OXYTOCIN/0.9 % SODIUM CHLORIDE 30/500 ML
100-340 PLASTIC BAG, INJECTION (ML) INTRAVENOUS CONTINUOUS PRN
Status: DISCONTINUED | OUTPATIENT
Start: 2024-06-18 | End: 2024-06-20 | Stop reason: HOSPADM

## 2024-06-18 RX ORDER — OXYTOCIN 10 [USP'U]/ML
10 INJECTION, SOLUTION INTRAMUSCULAR; INTRAVENOUS
Status: DISCONTINUED | OUTPATIENT
Start: 2024-06-18 | End: 2024-06-18 | Stop reason: HOSPADM

## 2024-06-18 RX ORDER — ONDANSETRON 2 MG/ML
INJECTION INTRAMUSCULAR; INTRAVENOUS PRN
Status: DISCONTINUED | OUTPATIENT
Start: 2024-06-18 | End: 2024-06-18

## 2024-06-18 RX ORDER — HYDROCORTISONE 25 MG/G
CREAM TOPICAL 3 TIMES DAILY PRN
Status: DISCONTINUED | OUTPATIENT
Start: 2024-06-18 | End: 2024-06-20 | Stop reason: HOSPADM

## 2024-06-18 RX ORDER — OXYTOCIN 10 [USP'U]/ML
10 INJECTION, SOLUTION INTRAMUSCULAR; INTRAVENOUS
Status: DISCONTINUED | OUTPATIENT
Start: 2024-06-18 | End: 2024-06-20 | Stop reason: HOSPADM

## 2024-06-18 RX ORDER — METOCLOPRAMIDE 5 MG/1
10 TABLET ORAL EVERY 6 HOURS PRN
Status: DISCONTINUED | OUTPATIENT
Start: 2024-06-18 | End: 2024-06-20 | Stop reason: HOSPADM

## 2024-06-18 RX ORDER — LOPERAMIDE HCL 2 MG
2 CAPSULE ORAL
Status: DISCONTINUED | OUTPATIENT
Start: 2024-06-18 | End: 2024-06-18 | Stop reason: HOSPADM

## 2024-06-18 RX ORDER — HYDROMORPHONE HCL IN WATER/PF 6 MG/30 ML
0.2 PATIENT CONTROLLED ANALGESIA SYRINGE INTRAVENOUS EVERY 5 MIN PRN
Status: DISCONTINUED | OUTPATIENT
Start: 2024-06-18 | End: 2024-06-18

## 2024-06-18 RX ORDER — CITRIC ACID/SODIUM CITRATE 334-500MG
30 SOLUTION, ORAL ORAL
Status: COMPLETED | OUTPATIENT
Start: 2024-06-18 | End: 2024-06-18

## 2024-06-18 RX ORDER — BUPIVACAINE HYDROCHLORIDE 2.5 MG/ML
INJECTION, SOLUTION EPIDURAL; INFILTRATION; INTRACAUDAL
Status: DISCONTINUED | OUTPATIENT
Start: 2024-06-18 | End: 2024-06-18

## 2024-06-18 RX ORDER — MODIFIED LANOLIN
OINTMENT (GRAM) TOPICAL
Status: DISCONTINUED | OUTPATIENT
Start: 2024-06-18 | End: 2024-06-20 | Stop reason: HOSPADM

## 2024-06-18 RX ORDER — MISOPROSTOL 200 UG/1
400 TABLET ORAL
Status: DISCONTINUED | OUTPATIENT
Start: 2024-06-18 | End: 2024-06-20 | Stop reason: HOSPADM

## 2024-06-18 RX ORDER — CEFAZOLIN SODIUM/WATER 2 G/20 ML
2 SYRINGE (ML) INTRAVENOUS SEE ADMIN INSTRUCTIONS
Status: DISCONTINUED | OUTPATIENT
Start: 2024-06-18 | End: 2024-06-18 | Stop reason: HOSPADM

## 2024-06-18 RX ORDER — MORPHINE SULFATE 1 MG/ML
INJECTION, SOLUTION EPIDURAL; INTRATHECAL; INTRAVENOUS
Status: COMPLETED | OUTPATIENT
Start: 2024-06-18 | End: 2024-06-18

## 2024-06-18 RX ORDER — KETOROLAC TROMETHAMINE 30 MG/ML
30 INJECTION, SOLUTION INTRAMUSCULAR; INTRAVENOUS EVERY 6 HOURS
Qty: 3 ML | Refills: 0 | Status: COMPLETED | OUTPATIENT
Start: 2024-06-18 | End: 2024-06-19

## 2024-06-18 RX ORDER — OXYTOCIN/0.9 % SODIUM CHLORIDE 30/500 ML
340 PLASTIC BAG, INJECTION (ML) INTRAVENOUS CONTINUOUS PRN
Status: DISCONTINUED | OUTPATIENT
Start: 2024-06-18 | End: 2024-06-20 | Stop reason: HOSPADM

## 2024-06-18 RX ORDER — BISACODYL 10 MG
10 SUPPOSITORY, RECTAL RECTAL DAILY PRN
Status: DISCONTINUED | OUTPATIENT
Start: 2024-06-20 | End: 2024-06-20 | Stop reason: HOSPADM

## 2024-06-18 RX ORDER — CEFAZOLIN SODIUM/WATER 2 G/20 ML
2 SYRINGE (ML) INTRAVENOUS
Status: COMPLETED | OUTPATIENT
Start: 2024-06-18 | End: 2024-06-18

## 2024-06-18 RX ORDER — SODIUM CHLORIDE, SODIUM LACTATE, POTASSIUM CHLORIDE, CALCIUM CHLORIDE 600; 310; 30; 20 MG/100ML; MG/100ML; MG/100ML; MG/100ML
INJECTION, SOLUTION INTRAVENOUS CONTINUOUS
Status: DISCONTINUED | OUTPATIENT
Start: 2024-06-18 | End: 2024-06-20 | Stop reason: HOSPADM

## 2024-06-18 RX ORDER — TRANEXAMIC ACID 10 MG/ML
1 INJECTION, SOLUTION INTRAVENOUS EVERY 30 MIN PRN
Status: DISCONTINUED | OUTPATIENT
Start: 2024-06-18 | End: 2024-06-18 | Stop reason: HOSPADM

## 2024-06-18 RX ORDER — METHYLERGONOVINE MALEATE 0.2 MG/ML
200 INJECTION INTRAVENOUS
Status: DISCONTINUED | OUTPATIENT
Start: 2024-06-18 | End: 2024-06-20 | Stop reason: HOSPADM

## 2024-06-18 RX ORDER — ONDANSETRON 2 MG/ML
4 INJECTION INTRAMUSCULAR; INTRAVENOUS EVERY 30 MIN PRN
Status: DISCONTINUED | OUTPATIENT
Start: 2024-06-18 | End: 2024-06-18

## 2024-06-18 RX ORDER — OXYTOCIN/0.9 % SODIUM CHLORIDE 30/500 ML
340 PLASTIC BAG, INJECTION (ML) INTRAVENOUS CONTINUOUS PRN
Status: DISCONTINUED | OUTPATIENT
Start: 2024-06-18 | End: 2024-06-18 | Stop reason: HOSPADM

## 2024-06-18 RX ORDER — SIMETHICONE 80 MG
80 TABLET,CHEWABLE ORAL 4 TIMES DAILY PRN
Status: DISCONTINUED | OUTPATIENT
Start: 2024-06-18 | End: 2024-06-20 | Stop reason: HOSPADM

## 2024-06-18 RX ORDER — LOPERAMIDE HCL 2 MG
2 CAPSULE ORAL
Status: DISCONTINUED | OUTPATIENT
Start: 2024-06-18 | End: 2024-06-20 | Stop reason: HOSPADM

## 2024-06-18 RX ORDER — FERROUS SULFATE 325(65) MG
325 TABLET ORAL DAILY
Status: DISCONTINUED | OUTPATIENT
Start: 2024-06-18 | End: 2024-06-20 | Stop reason: HOSPADM

## 2024-06-18 RX ORDER — OXYCODONE HYDROCHLORIDE 5 MG/1
5 TABLET ORAL EVERY 4 HOURS PRN
Status: DISCONTINUED | OUTPATIENT
Start: 2024-06-18 | End: 2024-06-20 | Stop reason: HOSPADM

## 2024-06-18 RX ORDER — HYDROMORPHONE HCL IN WATER/PF 6 MG/30 ML
0.4 PATIENT CONTROLLED ANALGESIA SYRINGE INTRAVENOUS EVERY 5 MIN PRN
Status: DISCONTINUED | OUTPATIENT
Start: 2024-06-18 | End: 2024-06-18

## 2024-06-18 RX ORDER — NALOXONE HYDROCHLORIDE 0.4 MG/ML
0.1 INJECTION, SOLUTION INTRAMUSCULAR; INTRAVENOUS; SUBCUTANEOUS
Status: DISCONTINUED | OUTPATIENT
Start: 2024-06-18 | End: 2024-06-20 | Stop reason: HOSPADM

## 2024-06-18 RX ORDER — DEXAMETHASONE SODIUM PHOSPHATE 10 MG/ML
INJECTION, SOLUTION INTRAMUSCULAR; INTRAVENOUS
Status: DISCONTINUED | OUTPATIENT
Start: 2024-06-18 | End: 2024-06-18

## 2024-06-18 RX ORDER — OXYTOCIN 10 [USP'U]/ML
10 INJECTION, SOLUTION INTRAMUSCULAR; INTRAVENOUS
Status: DISCONTINUED | OUTPATIENT
Start: 2024-06-18 | End: 2024-06-18

## 2024-06-18 RX ORDER — AMOXICILLIN 250 MG
1 CAPSULE ORAL 2 TIMES DAILY
Status: DISCONTINUED | OUTPATIENT
Start: 2024-06-18 | End: 2024-06-20 | Stop reason: HOSPADM

## 2024-06-18 RX ORDER — FENTANYL CITRATE 50 UG/ML
25 INJECTION, SOLUTION INTRAMUSCULAR; INTRAVENOUS EVERY 5 MIN PRN
Status: DISCONTINUED | OUTPATIENT
Start: 2024-06-18 | End: 2024-06-18

## 2024-06-18 RX ORDER — DEXTROSE, SODIUM CHLORIDE, SODIUM LACTATE, POTASSIUM CHLORIDE, AND CALCIUM CHLORIDE 5; .6; .31; .03; .02 G/100ML; G/100ML; G/100ML; G/100ML; G/100ML
INJECTION, SOLUTION INTRAVENOUS CONTINUOUS
Status: DISCONTINUED | OUTPATIENT
Start: 2024-06-18 | End: 2024-06-20 | Stop reason: HOSPADM

## 2024-06-18 RX ORDER — ONDANSETRON 2 MG/ML
4 INJECTION INTRAMUSCULAR; INTRAVENOUS EVERY 6 HOURS PRN
Status: DISCONTINUED | OUTPATIENT
Start: 2024-06-18 | End: 2024-06-20 | Stop reason: HOSPADM

## 2024-06-18 RX ORDER — LOPERAMIDE HCL 2 MG
4 CAPSULE ORAL
Status: DISCONTINUED | OUTPATIENT
Start: 2024-06-18 | End: 2024-06-18 | Stop reason: HOSPADM

## 2024-06-18 RX ORDER — MISOPROSTOL 200 UG/1
400 TABLET ORAL
Status: DISCONTINUED | OUTPATIENT
Start: 2024-06-18 | End: 2024-06-18 | Stop reason: HOSPADM

## 2024-06-18 RX ORDER — OXYTOCIN 10 [USP'U]/ML
INJECTION, SOLUTION INTRAMUSCULAR; INTRAVENOUS PRN
Status: DISCONTINUED | OUTPATIENT
Start: 2024-06-18 | End: 2024-06-20 | Stop reason: HOSPADM

## 2024-06-18 RX ORDER — PROCHLORPERAZINE 25 MG
25 SUPPOSITORY, RECTAL RECTAL EVERY 12 HOURS PRN
Status: DISCONTINUED | OUTPATIENT
Start: 2024-06-18 | End: 2024-06-20 | Stop reason: HOSPADM

## 2024-06-18 RX ORDER — ONDANSETRON 4 MG/1
4 TABLET, ORALLY DISINTEGRATING ORAL EVERY 6 HOURS PRN
Status: DISCONTINUED | OUTPATIENT
Start: 2024-06-18 | End: 2024-06-20 | Stop reason: HOSPADM

## 2024-06-18 RX ORDER — PROCHLORPERAZINE MALEATE 5 MG
10 TABLET ORAL EVERY 6 HOURS PRN
Status: DISCONTINUED | OUTPATIENT
Start: 2024-06-18 | End: 2024-06-20 | Stop reason: HOSPADM

## 2024-06-18 RX ORDER — DEXAMETHASONE SODIUM PHOSPHATE 10 MG/ML
4 INJECTION, SOLUTION INTRAMUSCULAR; INTRAVENOUS
Status: DISCONTINUED | OUTPATIENT
Start: 2024-06-18 | End: 2024-06-18

## 2024-06-18 RX ORDER — ONDANSETRON 4 MG/1
4 TABLET, ORALLY DISINTEGRATING ORAL EVERY 30 MIN PRN
Status: DISCONTINUED | OUTPATIENT
Start: 2024-06-18 | End: 2024-06-18

## 2024-06-18 RX ORDER — LIDOCAINE 40 MG/G
CREAM TOPICAL
Status: DISCONTINUED | OUTPATIENT
Start: 2024-06-18 | End: 2024-06-20 | Stop reason: HOSPADM

## 2024-06-18 RX ADMIN — Medication 2 G: at 09:43

## 2024-06-18 RX ADMIN — FERROUS SULFATE TAB 325 MG (65 MG ELEMENTAL FE) 325 MG: 325 (65 FE) TAB at 12:32

## 2024-06-18 RX ADMIN — BUPIVACAINE 10 ML: 13.3 INJECTION, SUSPENSION, LIPOSOMAL INFILTRATION at 10:37

## 2024-06-18 RX ADMIN — PHENYLEPHRINE HYDROCHLORIDE 100 MCG: 10 INJECTION INTRAVENOUS at 10:03

## 2024-06-18 RX ADMIN — BUPIVACAINE 10 ML: 13.3 INJECTION, SUSPENSION, LIPOSOMAL INFILTRATION at 10:32

## 2024-06-18 RX ADMIN — SENNOSIDES AND DOCUSATE SODIUM 1 TABLET: 8.6; 5 TABLET ORAL at 22:14

## 2024-06-18 RX ADMIN — BUPIVACAINE HYDROCHLORIDE 15 ML: 2.5 INJECTION, SOLUTION EPIDURAL; INFILTRATION; INTRACAUDAL; PERINEURAL at 10:32

## 2024-06-18 RX ADMIN — DEXAMETHASONE SODIUM PHOSPHATE 5 MG: 10 INJECTION, SOLUTION INTRAMUSCULAR; INTRAVENOUS at 10:32

## 2024-06-18 RX ADMIN — Medication 500 ML/HR: at 09:54

## 2024-06-18 RX ADMIN — SODIUM CHLORIDE, POTASSIUM CHLORIDE, SODIUM LACTATE AND CALCIUM CHLORIDE: 600; 310; 30; 20 INJECTION, SOLUTION INTRAVENOUS at 09:26

## 2024-06-18 RX ADMIN — SODIUM CHLORIDE, POTASSIUM CHLORIDE, SODIUM LACTATE AND CALCIUM CHLORIDE 1000 ML: 600; 310; 30; 20 INJECTION, SOLUTION INTRAVENOUS at 08:21

## 2024-06-18 RX ADMIN — DEXAMETHASONE SODIUM PHOSPHATE 5 MG: 10 INJECTION, SOLUTION INTRAMUSCULAR; INTRAVENOUS at 10:37

## 2024-06-18 RX ADMIN — BUPIVACAINE HYDROCHLORIDE IN DEXTROSE 1.6 ML: 7.5 INJECTION, SOLUTION SUBARACHNOID at 09:35

## 2024-06-18 RX ADMIN — ONDANSETRON 4 MG: 2 INJECTION INTRAMUSCULAR; INTRAVENOUS at 09:10

## 2024-06-18 RX ADMIN — KETOROLAC TROMETHAMINE 30 MG: 30 INJECTION, SOLUTION INTRAMUSCULAR at 22:14

## 2024-06-18 RX ADMIN — MORPHINE SULFATE 0.15 MG: 1 INJECTION EPIDURAL; INTRATHECAL; INTRAVENOUS at 09:35

## 2024-06-18 RX ADMIN — ACETAMINOPHEN 975 MG: 325 TABLET, FILM COATED ORAL at 18:35

## 2024-06-18 RX ADMIN — KETOROLAC TROMETHAMINE 15 MG: 30 INJECTION, SOLUTION INTRAMUSCULAR at 10:20

## 2024-06-18 RX ADMIN — BUPIVACAINE HYDROCHLORIDE 15 ML: 2.5 INJECTION, SOLUTION EPIDURAL; INFILTRATION; INTRACAUDAL; PERINEURAL at 10:37

## 2024-06-18 RX ADMIN — SODIUM CITRATE AND CITRIC ACID MONOHYDRATE 30 ML: 500; 334 SOLUTION ORAL at 09:10

## 2024-06-18 RX ADMIN — SENNOSIDES AND DOCUSATE SODIUM 1 TABLET: 8.6; 5 TABLET ORAL at 12:32

## 2024-06-18 RX ADMIN — ACETAMINOPHEN 650 MG: 325 TABLET, FILM COATED ORAL at 06:28

## 2024-06-18 RX ADMIN — PHENYLEPHRINE HYDROCHLORIDE 100 MCG: 10 INJECTION INTRAVENOUS at 09:48

## 2024-06-18 RX ADMIN — Medication 340 ML/HR: at 11:04

## 2024-06-18 RX ADMIN — ACETAMINOPHEN 975 MG: 325 TABLET, FILM COATED ORAL at 23:57

## 2024-06-18 RX ADMIN — ACETAMINOPHEN 975 MG: 325 TABLET, FILM COATED ORAL at 12:32

## 2024-06-18 RX ADMIN — KETOROLAC TROMETHAMINE 30 MG: 30 INJECTION, SOLUTION INTRAMUSCULAR at 16:28

## 2024-06-18 ASSESSMENT — ACTIVITIES OF DAILY LIVING (ADL)
ADLS_ACUITY_SCORE: 20
ADLS_ACUITY_SCORE: 21
ADLS_ACUITY_SCORE: 20

## 2024-06-18 ASSESSMENT — LIFESTYLE VARIABLES: TOBACCO_USE: 1

## 2024-06-18 NOTE — PROGRESS NOTES
came to the bedside for consistent cat 2 strip. Patient laying on her left side and started a 250 ml fluid bolus started.   Plan to watch for 15 minutes and then update Dr. Carrera.

## 2024-06-18 NOTE — ANESTHESIA CARE TRANSFER NOTE
Patient: Mecca Bills    Procedure: Procedure(s):   SECTION       Diagnosis: Supervision of high-risk pregnancy of elderly primigravida [O09.519]  Non-reactive NST (non-stress test) [O28.8]  Diagnosis Additional Information: No value filed.    Anesthesia Type:   Spinal     Note:    Oropharynx: oropharynx clear of all foreign objects and spontaneously breathing  Level of Consciousness: awake      Independent Airway: airway patency satisfactory and stable  Dentition: dentition unchanged  Vital Signs Stable: post-procedure vital signs reviewed and stable  Report to RN Given: handoff report given  Patient transferred to: PACU    Handoff Report: Identifed the Patient, Identified the Reponsible Provider, Reviewed the pertinent medical history, Discussed the surgical course, Reviewed Intra-OP anesthesia mangement and issues during anesthesia, Set expectations for post-procedure period and Allowed opportunity for questions and acknowledgement of understanding      Vitals:  Vitals Value Taken Time   /78 24 1053   Temp     Pulse 68 24 1053   Resp 13 24 1053   SpO2 97 % 24 1053   Vitals shown include unfiled device data.    Electronically Signed By: JOANN Ribera CRNA  2024  10:58 AM

## 2024-06-18 NOTE — PLAN OF CARE
Goal Outcome Evaluation:    Patient resting comfortably in room with some mild to moderate discomfort with contractions through the night shift. Vitals stable, WNL. No signs of preeclampsia. FHTs 135 with minimal variability, accels present, no decels.

## 2024-06-18 NOTE — PLAN OF CARE
Goal Outcome Evaluation:    Cervical ripening for post dates/AMA     at 40w5d    VSS. SVE unchanged since corrigan balloon placement and removal per KA. Cervidil placement at 2230. Patent is resting now with hydroxyzine dosed. She verbalizes periods of anxiousness but is reassured easily by answering questions and care explanations. Frankie is at bedside, attentive and caring. Contractions have been regular, mild and tolerable for the patient. Voiding spontaneously. FHR baseline has been 130/135 with intermittent periods of minimal variability with accels and occasional variables that resolves to moderate variability.     Plan for routine cervical ripening through the night with pitocin induction in the AM. Report given to receiving RN, Owen LAUREN and Maddie SIERRA

## 2024-06-18 NOTE — PROGRESS NOTES
Patient was feeling contractions almost every 1 to 1-1/2 minutes.  Not getting a break, very intense.  I deflated the Pete balloon initially by 20 mL, with no improvement and then another 20 mL leaving her with a 35 mill balloon.  She now feels the contractions are more tolerable, still quite intense and every 2 to 3 minutes but more manageable.  She sitting on the labor ball at the bedside breathing through contractions doing well.  Will continue to monitor for the next half an hour or so and if not manageable then will take the balloon out but hopefully be able to leave it in for progress.    Yodit Lomeli MD

## 2024-06-18 NOTE — PROGRESS NOTES
Nicol not tracing FHR since balloon placement. New patch placed and new nicol tried without success. Moved back to using TOCO and US for tracing. KA back to bedside to reduce fluid in corrigan balloon as patient states she is feeling constant cramping present between and with contractions. Contractions tracing every  1.5-4. Fluid in bulb reduced to 35ml. Patient felt relief with the reduction. LR bolius also started. Patient up and moving at bedside and on birthing ball.

## 2024-06-18 NOTE — PROGRESS NOTES
Provider notification:   Provider: Yani AHUMADA was notified at 1950 regarding: a persistent Category II fetal heart rate tracing for 20 minutes.    Category II Algorithm     Fetal heart rate and uterine activity reviewed with provider.    EFM interpretation suggests: concern for persistent Category II tracing due to: absent variability.  EFM suggests no concern for interruption of the oxygen pathway. LR bolus complete, LL position.     After discussion with provider:Provider coming to bedside and corrigan balloon removal    Plan per provider / orders received for corrigan balloon  removal

## 2024-06-18 NOTE — CONSULTS
Care Transitions Note:    CTS staff received notification from [facility] RN informing that a cord tissue segment was sent for drug detection panel based on patient's current marijuana use criteria.    CTS to follow for results.    Patient's urine drug screen is positive for marijuana. Per policy, child protection report made with [facility] Child Protection Intake [phone] Phone. Report faxed to [phone].    DARI Martinez  [facility] [phone]/ [facility] [phone]  Care Management

## 2024-06-18 NOTE — PROVIDER NOTIFICATION
Janessa at bedside for SVE and cervidil placement. No cervical change despite regular contractions. FHR category 1. Cervidil placed by NORM. Patient repositioned following and planning for rest.     06/17/24 2230   Vaginal Exam   Method sterile exam per physician   Vaginal Bleeding bloody show   Cervical Position posterior   Cervical Consistency soft   Cervical Dilation (cm) 1   Cervical Effacement 50%   Fetal Presentation & Position cephalic   Fetal Station -2

## 2024-06-18 NOTE — PROGRESS NOTES
Doing well. Feeling better, still feeling her contractions but not nearly as painful as when she had the balloon in.  Baby has continued to have a reactive tracing with good variability.  No new concerns.    O:    Vitals:    06/17/24 1320 06/17/24 1540 06/17/24 1910 06/17/24 2210   BP: 135/72 125/75 131/73 118/60   BP Location:    Right arm   Patient Position:   Sitting Left side   Cuff Size:   Adult Regular Adult Regular   Pulse: 75 88     Resp: 16 16 18 18   Temp: 98.1  F (36.7  C) 97.8  F (36.6  C) 98.1  F (36.7  C) 98.2  F (36.8  C)   TempSrc: Oral Oral Oral Oral   SpO2:  99%        Vitals noted.  Patient alert, oriented, and in no acute distress.      with moderate variability, no true 20x20 accels, no decels but does have 15x15 accels.   Ctx q 4-5 minutes.   SVE unchanged, 1 cm, posterior, soft, 40%, -3    I did discuss options with patient and nurse. We opted for cervidil through the night as she has already had 4 doses of cytotec with minimal effect. Also balloon was not tolerated.   If this works, anticipate starting pitocin in am. If this does not work, may wish to do BPP in am and consider sending her home to wait for consider operative delivery depending on fetal status in am.     Yodit Lomeli MD      The patient is a 3y11m Male complaining of abdominal pain.

## 2024-06-18 NOTE — PROGRESS NOTES
"Pt c/o increase pain & more ctxs, palpated about every 3-4\" ctxs, palpating moderate. Pt also states, \"it hurts a lot more\", pt was assisted up to the tub, given some clear liquids & Tylenol 650mg given for cramping pain. Doppler was assessed at 0631 while in tub & FHT was noted to be 140's. Dr. Pollock is in the unit & was updated on FH tracing being 140's with minimal variability with accels to the 155's & pt c/o of increase pain with ctxs. MD ordered to keep cervidil in place & MD will assess pt when out of tub.  "

## 2024-06-18 NOTE — PROGRESS NOTES
Dr. Carrera called for persistent cat 2 strip, Dr. Carrera said to place patient on her right side and call back with update in 5-10 minutes.

## 2024-06-18 NOTE — ANESTHESIA PROCEDURE NOTES
TAP Procedure Note    Pre-Procedure   Staff -        CRNA: Malachi Storey APRN CRNA       Other Anesthesia Staff: Dorene Degroot       Performed By: BEAU       Location: OR       Procedure Start/Stop Times: 6/18/2024 10:29 AM and 6/18/2024 10:37 AM       Pre-Anesthestic Checklist: patient identified, IV checked, site marked, risks and benefits discussed, informed consent, monitors and equipment checked, pre-op evaluation, at physician/surgeon's request and post-op pain management  Timeout:       Correct Patient: Yes        Correct Procedure: Yes        Correct Site: Yes        Correct Position: Yes        Correct Laterality: Yes        Site Marked: Yes  Procedure Documentation  Procedure: TAP       Laterality: bilateral       Patient Position: supine       Patient Prep/Sterile Barriers: sterile gloves, mask       Skin prep: Chloraprep       Needle Type: insulated       Needle Gauge: 21.        Needle Length (millimeters): 100        Ultrasound guided       1. Ultrasound was used to identify targeted nerve, plexus, vascular marker, or fascial plane and place a needle adjacent to it in real-time.       2. Ultrasound was used to visualize the spread of anesthetic in close proximity to the above referenced structure.       3. A permanent image is entered into the patient's record.       4. The visualized anatomic structures appeared normal.       5. There were no apparent abnormal pathologic findings.    Assessment/Narrative         The placement was negative for: blood aspirated, painful injection and site bleeding       Paresthesias: No.       Test dose of mL at.         Test dose negative, 3 minutes after injection, for signs of intravascular, subdural, or intrathecal injection.       Bolus given via needle..        Secured via.        Insertion/Infusion Method: Single Shot       Complications: none       Injection made incrementally with aspirations every 5 mL.    Medication(s) Administered   Bupivacaine 0.25%  "PF (Infiltration) - Infiltration   15 mL - 6/18/2024 10:32:00 AM   15 mL - 6/18/2024 10:37:00 AM  Dexamethasone 10 mg/mL PF (Perineural) - Perineural   5 mg - 6/18/2024 10:32:00 AM   5 mg - 6/18/2024 10:37:00 AM  Bupivacaine liposome (Exparel) 1.3% LA inj susp (Infiltration) - Infiltration   10 mL - 6/18/2024 10:32:00 AM   10 mL - 6/18/2024 10:37:00 AM  Medication Administration Time: 6/18/2024 10:29 AM      FOR [facility] ([facility]) ONLY:   Pain Team Contact information: please page the Pain Team [address]. Search \"Pain\". During daytime hours, please page the attending first. At night please page the resident first.      "

## 2024-06-18 NOTE — ANESTHESIA PREPROCEDURE EVALUATION
Anesthesia Pre-Procedure Evaluation    Patient: Mecca Bills   MRN: [id number] : 1990        Procedure :           Past Medical History:   Diagnosis Date     History of anxiety      History of chlamydia      History of infertility       Past Surgical History:   Procedure Laterality Date     WISDOM TOOTH EXTRACTION        No Known Allergies   Social History     Tobacco Use     Smoking status: Every Day     Types: Vaping Device     Smokeless tobacco: Never   Substance Use Topics     Alcohol use: Yes      Wt Readings from Last 1 Encounters:   24 83.5 kg (184 lb)        Anesthesia Evaluation   Pt has had prior anesthetic. Type: MAC.    No history of anesthetic complications       ROS/MED HX  ENT/Pulmonary:  - neg pulmonary ROS   (+)                tobacco use, Current use,                       Neurologic:  - neg neurologic ROS     Cardiovascular:  - neg cardiovascular ROS     METS/Exercise Tolerance: >4 METS    Hematologic:  - neg hematologic  ROS     Musculoskeletal:  - neg musculoskeletal ROS     GI/Hepatic:  - neg GI/hepatic ROS     Renal/Genitourinary:  - neg Renal ROS     Endo:  - neg endo ROS     Psychiatric/Substance Use:     (+) psychiatric history anxiety and depression   Recreational drug usage: Cannabis.    Infectious Disease:  - neg infectious disease ROS     Malignancy:  - neg malignancy ROS     Other:  - neg other ROS          Physical Exam    Airway  airway exam normal      Mallampati: II   TM distance: > 3 FB   Neck ROM: full   Mouth opening: > 3 cm    Respiratory Devices and Support         Dental  no notable dental history         Cardiovascular   cardiovascular exam normal       Rhythm and rate: regular and normal     Pulmonary   pulmonary exam normal        breath sounds clear to auscultation         OUTSIDE LABS:  CBC:   Lab Results   Component Value Date    WBC 19.7 (H) 2024    WBC 11.2 (H) 12/10/2023    HGB 10.0 (L) 2024    HGB 12.3 12/10/2023    HCT 30.2 (L)  "06/16/2024    HCT 35.6 12/10/2023     06/16/2024     12/10/2023     BMP:   Lab Results   Component Value Date     12/10/2023    POTASSIUM 3.5 12/10/2023    POTASSIUM 4.7 10/06/2011    CHLORIDE 101 12/10/2023    CO2 21 (L) 12/10/2023    BUN 7.0 12/10/2023    CR 0.50 (L) 12/10/2023    CR 0.6 10/06/2011     (H) 12/10/2023    GLC 89 10/06/2011     COAGS: No results found for: \"PTT\", \"INR\", \"FIBR\"  POC: No results found for: \"BGM\", \"HCG\", \"HCGS\"  HEPATIC:   Lab Results   Component Value Date    ALBUMIN 4.1 12/10/2023    PROTTOTAL 7.0 12/10/2023    ALT 13 12/10/2023    AST 22 12/10/2023    ALKPHOS 61 12/10/2023    BILITOTAL <0.2 12/10/2023     OTHER:   Lab Results   Component Value Date    ERLINDA 9.5 12/10/2023    TSH 1.57 10/06/2011       Anesthesia Plan    ASA Status:  2    NPO Status:  NPO Appropriate    Anesthesia Type: Spinal.              Consents    Anesthesia Plan(s) and associated risks, benefits, and realistic alternatives discussed. Questions answered and patient/representative(s) expressed understanding.     - Discussed: Risks, Benefits and Alternatives for BOTH SEDATION and the PROCEDURE were discussed     - Discussed with:  Patient      - Extended Intubation/Ventilatory Support Discussed: No.      - Patient is DNR/DNI Status: No     Use of blood products discussed: No .     Postoperative Care    Pain management: IV analgesics, Neuraxial analgesia, Oral pain medications, intrathecal morphine, Peripheral nerve block (Single Shot), Multi-modal analgesia.   PONV prophylaxis: Ondansetron (or other 5HT-3), Dexamethasone or Solumedrol     Comments:               JOANN Saravia CRNA    I have reviewed the pertinent notes and labs in the chart from the past 30 days and (re)examined the patient.  Any updates or changes from those notes are reflected in this note.             "

## 2024-06-18 NOTE — PROGRESS NOTES
S:  Section Delivery  B: Primary  Section for persistent category 2 FHT  A: Baby delivered, cord clamping delayed for 60-90 seconds, then brought to pre- warmed infant warmer. Infant stimulated and dried. Infant then brought to mother and placed skin to skin for bonding. Apgars 8/9. Educated mother on expected feeding readiness cues and encouraged her to observe for feeding cues. Mother informed that breast feeding assistance would be provided.   R: Mother and baby bonding well. Anticipate first feed within the hour.

## 2024-06-18 NOTE — PROGRESS NOTES
Dr siddiqui updated on minimal variability  with position changes and fruit juice with no significant change through the night. Orders to continue to change positions, clear liquids at this time.If still minimal variability take out cervadil and check cervix.

## 2024-06-18 NOTE — PROGRESS NOTES
Category 2 with minimal available in the last hour despite conservative management with positional change and IV bolus as well as stopping labor augmentation/induction.  Been induced in the last couple days.  No cervical change despite of getting 4 doses of Cytotec and then 1 dose of Cervidil.  Been having recurrent intermittent category 2 NST with minimal available since yesterday.    Discussed with patient and her  about the potential implication associated with the current tracing as well potential effect on the  care.  I am very concerned of minimal variability in the last hour.  Option discussed including continuing to monitor versus call for .  Pros and cons of each options also discussed.  I recommend a  and they agreed.    Due to the prolonged category 2 NST, urgent  was called.    Patient and  felt comfortable with the plan and all their questions were answered.    Ryan Carrera MD.

## 2024-06-18 NOTE — PROGRESS NOTES
Late entry due to care for another patient.   Just before 8:00 pm patient was still very uncomfortable and baby's strip was flat with absent variability and so patient was repositioned and then we decided to remove the corrigan balloon. This was deflated and easily removed.   Patient felt better after removal.     She was encouraged to eat a light meal such as juice/broth, etc.     Now she has been monitored for almost 2 hours without balloon in place.   She continues to feel contractions, mild, rating 4/10. They are occurring approx every 3-5 minutes, not showing up well on monitor.     FHT now looking good but previously flat with no accels.   She now has moderate variability and accels present, no decels.     Will recheck her in next 30-60 minutes and consider further cervical ripening plan for the night.     Yodit Lomeli MD

## 2024-06-18 NOTE — OR NURSING
Transfer from  OB PACU to Room 359  Transferred to bed via Own Bed (Glyder Mat,Transfer Boar,Slider Sheet)    S: 35 y/o female  S/P        Anesthesia Type:  spinal with TAP block       Surgeon:  Dr. Carrera       Allergies:  See Medication Reconciliation Record       DNR: NO  (Yes,No)    B:  Pertinent Medical History:   Past Medical History:   Diagnosis Date    History of anxiety     History of chlamydia     History of infertility         (CHF; Heart Disease; Lung Disease; Chronic Pain; Diabetes; Other (Comment)          Surgical History:    Past Surgical History:   Procedure Laterality Date    WISDOM TOOTH EXTRACTION           A:  EBL: 375        IVF:  600 LR + 500 Pitocin        UOP:  350        NPO:  ___Yes _X__No         Vomiting:  ___Yes __X_No         Drainage: Lochia - rubra          Skin Integrity: Normal (Normal; Pressure Ulcer (Location)        RFO: __X_Yes___No (identify item if present) Urinary Catheter for surgical procedure.                See PACU record for ongoing assessment, vital signs and pain assessment.    R: Post-Op vitals and assessments as ordered/indicated per patient's condition.       Follow Post-Op orders and notify Physician prn.       Continue to involve patient/family in plan of care and discharge planning.       Reinforce Pre-Operative education.       Implement skin safety interventions as appropriate.    Name:   Alena Kate RN BSN

## 2024-06-18 NOTE — PROGRESS NOTES
51wan asked another nurse to notify that there is still a cat 2 strip. Dr. Carrera Notified by RUBÉN Deal and no new orders received. Dr. Carrera will be up to the beside soon.

## 2024-06-18 NOTE — PROGRESS NOTES
Reviewed the medical record, received signout from Dr. Lomeli.  She is  at 41  weeks today.  Had 4 Cytotec doses.  Did not tolerate the Pete balloon yesterday therefore it was removed shortly.  She was admitted for induction couple days ago for term pregnancy with AMA.  Cervidil was placed in about 8 hours ago.    Good prenatal care.  Pregnancy was complicated with AMA at the age of 34 at the time of delivery and history of THC use.  Prenatal lab for was normal with blood type O-.  Received appropriate RhoGAM injection.  Group B strep was negative.  No gestational diabetes    Been having more pain painful contractions in the last several hours.  Vital signs have been stable and normal.    She has been having frequent intermittent category 2 NST with minimal variable - spontaneous recovery without intervention.      Current NST is category 2, minimal available with occasional acceleration, no decelerations.  Fetal heart rate is around 150.  Contractions is about every 4-5 minutes.  Painful contractions.    I checked the cervix: 2, 60, -4 soft and posterior.  Extremely uncomfortable with the cervical check.  Cervidil fell out.    Will monitor for 30 minutes.  If NST category 2 is not resolved, will discussed about alternative mode of delivery.  We are hopeful that she will be able to have this baby vagainally    If doing well, will start Pitocin.  Discussed about pain management, she is interested in epidural.    Assessment and plan discussed with patient and her  in great details.  All of their questions were answered.    Plan to have the baby vaginal delivery but she is aware that she is at higher risk for having a .    Nursing staff was updated.    Ryan Carrera MD.

## 2024-06-18 NOTE — ANESTHESIA PROCEDURE NOTES
"Intrathecal injection Procedure Note    Pre-Procedure   Staff -        CRNA: Malachi Storey APRN CRNA       Performed By: CRNA       Location: OR       Procedure Start/Stop Times: 6/18/2024 9:33 AM and 6/18/2024 9:35 AM       Pre-Anesthestic Checklist: patient identified, IV checked, risks and benefits discussed, informed consent, monitors and equipment checked, pre-op evaluation, at physician/surgeon's request and post-op pain management  Timeout:       Correct Patient: Yes        Correct Procedure: Yes        Correct Site: Yes        Correct Position: Yes   Procedure Documentation  Procedure: intrathecal injection       Patient Position: sitting       Skin prep: Betadine       Insertion Site: L3-4. (midline approach).       Needle Gauge: 25.        Needle Length (Inches): 3.5        Spinal Needle Type: Pencan       Introducer used       # of attempts: 1 and  # of redirects:     Assessment/Narrative         Paresthesias: No.       CSF fluid: clear.       Opening pressure was cmH2O while  Sitting.      Medication(s) Administered   0.75% Hyperbaric Bupivacaine (Intrathecal) - Intrathecal   1.6 mL - 6/18/2024 9:35:00 AM  Morphine PF 1 mg/mL (Intrathecal) - Intrathecal   0.15 mg - 6/18/2024 9:35:00 AM  Medication Administration Time: 6/18/2024 9:33 AM      FOR [facility] ([facility]) ONLY:   Pain Team Contact information: please page the Pain Team [address]. Search \"Pain\". During daytime hours, please page the attending first. At night please page the resident first.      "

## 2024-06-19 LAB
ABO/RH(D): NORMAL
FETAL BLEED SCREEN: NORMAL
HGB BLD-MCNC: 10.3 G/DL (ref 11.7–15.7)
SPECIMEN EXPIRATION DATE: NORMAL

## 2024-06-19 PROCEDURE — 85018 HEMOGLOBIN: CPT | Performed by: FAMILY MEDICINE

## 2024-06-19 PROCEDURE — 36415 COLL VENOUS BLD VENIPUNCTURE: CPT | Performed by: FAMILY MEDICINE

## 2024-06-19 PROCEDURE — 85461 HEMOGLOBIN FETAL: CPT | Performed by: FAMILY MEDICINE

## 2024-06-19 PROCEDURE — 120N000001 HC R&B MED SURG/OB

## 2024-06-19 PROCEDURE — 250N000011 HC RX IP 250 OP 636: Mod: JZ | Performed by: FAMILY MEDICINE

## 2024-06-19 PROCEDURE — 250N000013 HC RX MED GY IP 250 OP 250 PS 637: Performed by: FAMILY MEDICINE

## 2024-06-19 RX ADMIN — HUMAN RHO(D) IMMUNE GLOBULIN 300 MCG: 1500 SOLUTION INTRAMUSCULAR; INTRAVENOUS at 10:19

## 2024-06-19 RX ADMIN — ACETAMINOPHEN 975 MG: 325 TABLET, FILM COATED ORAL at 06:15

## 2024-06-19 RX ADMIN — ACETAMINOPHEN 975 MG: 325 TABLET, FILM COATED ORAL at 14:00

## 2024-06-19 RX ADMIN — SENNOSIDES AND DOCUSATE SODIUM 2 TABLET: 8.6; 5 TABLET ORAL at 21:11

## 2024-06-19 RX ADMIN — SENNOSIDES AND DOCUSATE SODIUM 1 TABLET: 8.6; 5 TABLET ORAL at 08:11

## 2024-06-19 RX ADMIN — KETOROLAC TROMETHAMINE 30 MG: 30 INJECTION, SOLUTION INTRAMUSCULAR at 04:27

## 2024-06-19 RX ADMIN — IBUPROFEN 800 MG: 800 TABLET, FILM COATED ORAL at 17:01

## 2024-06-19 RX ADMIN — ACETAMINOPHEN 975 MG: 325 TABLET, FILM COATED ORAL at 21:11

## 2024-06-19 RX ADMIN — IBUPROFEN 800 MG: 800 TABLET, FILM COATED ORAL at 10:19

## 2024-06-19 RX ADMIN — FERROUS SULFATE TAB 325 MG (65 MG ELEMENTAL FE) 325 MG: 325 (65 FE) TAB at 08:10

## 2024-06-19 ASSESSMENT — ACTIVITIES OF DAILY LIVING (ADL)
ADLS_ACUITY_SCORE: 20

## 2024-06-19 NOTE — PROGRESS NOTES
S: Shift note  B: Day 1 post partum, primary c/s.  A: VSS. Breastfeeding with minimal assistance. Bonding well with baby. Incision open to air. Flow minimal.  Pt has many questions. Watched mom/baby care video.  R: Encourage rest, assist with BF, anticipate discharge tomorrow.

## 2024-06-19 NOTE — PLAN OF CARE
Goal Outcome Evaluation:    S: Shift review  B:Mecca is a , day 0 post  birth.  A: Stable post-op, incision is clean, dry, intact. Abdominal binder given and used. Lung sounds-clear, bowel sounds active in all 4 quadrants, tolerating regular diet without issues. Independent with mobility, up for shower at end of shift. Pain control achieved with scheduled p.o. pain meds. Ice in use to abdomen intermittently. Voided x2 adequately. Fundus firm, midline, at umbilicus. Rhogam workup scheduled for 0600 tomorrow AM- will need rhogam. Handles baby with confidence. Minimal assistance needed with breastfeeding. Ambulating frequently and educated on use of SCDs.   R: Continue with plan of care. Offer pain meds routinely.

## 2024-06-19 NOTE — ANESTHESIA POSTPROCEDURE EVALUATION
Patient: Mecca Bills    Procedure: Procedure(s):   SECTION       Anesthesia Type:  Spinal    Note:  Disposition: Inpatient   Postop Pain Control: Uneventful            Sign Out: Well controlled pain   PONV: No   Neuro/Psych: Uneventful            Sign Out: Acceptable/Baseline neuro status   Airway/Respiratory: Uneventful            Sign Out: Acceptable/Baseline resp. status   CV/Hemodynamics: Uneventful            Sign Out: Acceptable CV status   Other NRE: NONE   DID A NON-ROUTINE EVENT OCCUR? No    Event details/Postop Comments:  Pt was happy with her anesthesia care.  No complications.  I advised the pt she may have some soreness at the spinal and this is normal.  However if the soreness continues over a week or if redness is noted around the site to let anesthesia know.  TAP blocks working very well - reporting minimal pain. I will follow up with the pt if needed.           Last vitals:  Vitals Value Taken Time   /71 24 1135   Temp 98.5  F (36.9  C) 24 1135   Pulse 67 24 1140   Resp 18 24 1140   SpO2 98 % 24 1140       Electronically Signed By: Nataly Otero, JOANN CRNA  2024  12:29 PM

## 2024-06-19 NOTE — PROGRESS NOTES
[facility]   Obstetrics Post-Op / Progress Note         Assessment and Plan:    Assessment:   Post-operative day #1  Low transverse primary  section  L&D complications: Fetal distress      Doing well.  Clean wound without signs of infection.  Normal healing wound.  No immediate surgical complications identified.  No excessive bleeding  Pain well-controlled.      Plan:   Ambulation encouraged  Monitor wound for signs of infection  Pain control measures as needed  Reportable signs and symptoms dicussed with the patient  RhoGam given  Anticipate discharge in 1-2 days            Interval History:     Doing well.  Pain is well-controlled.  No fevers.  No history of wound drainage, warmth or significant erythema.  Good appetite.  Denies chest pain, shortness of breath, nausea or vomiting.  Ambulatory.  Breastfeeding well.          Significant Problems:    None          Review of Systems:    The patient denies any chest pain, shortness of breath, excessive pain, fever, chills, purulent drainage from the wound, nausea or vomiting.          Medications:   All medications related to the patient's surgery have been reviewed          Physical Exam:   All vitals stable  Temp: 98  F (36.7  C) Temp src: Oral BP: 129/76 Pulse: 73   Resp: 18 SpO2: 98 % O2 Device: None (Room air)    Wound clean and dry with minimal or no drainage.  Surrounding skin with minimal erythema.  Constitutional:   awake, alert, cooperative, no apparent distress, and appears stated age     Neck:   Supple, symmetrical, trachea midline, no adenopathy, thyroid symmetric, not enlarged and no tenderness, skin normal     Lungs:   No increased work of breathing, good air exchange, clear to auscultation bilaterally, no crackles or wheezing     Cardiovascular:   Normal apical impulse, regular rate and rhythm, normal S1 and S2, no S3 or S4, and no murmur noted     Abdomen:   Incision clean, dry, intact.  Fundus firm at umbilicus.   Nontender.     Musculoskeletal:   no lower extremity pitting edema present  there is no redness, warmth, or swelling of the joints  full range of motion noted             Data:   All laboratory data related to this surgery reviewed  All imaging studies related to this surgery reviewed    Tommy Leigh MD, MD

## 2024-06-19 NOTE — PLAN OF CARE
Goal Outcome Evaluation:      Plan of Care Reviewed With: patient, spouse    Overall Patient Progress: improving    S: Shift review  B:Mecca is a , day 1 post  birth.  A: Stable post-op, incision is clean dry and intact, Lung sounds-clear, bowel sounds active in all 4 quadrants, independent with mobility, pain control achieved with toradol and tylenol. Fundus firm and midline, U/1. Voiding adequately. Rhogam workup will be this morning. Handles baby with confidence. Breastfeeding independently.   R: Continue with plan of care. Offer pain meds routinely.

## 2024-06-20 VITALS
TEMPERATURE: 98 F | SYSTOLIC BLOOD PRESSURE: 129 MMHG | WEIGHT: 178.4 LBS | HEART RATE: 74 BPM | DIASTOLIC BLOOD PRESSURE: 71 MMHG | OXYGEN SATURATION: 98 % | BODY MASS INDEX: 24.84 KG/M2 | RESPIRATION RATE: 16 BRPM

## 2024-06-20 PROCEDURE — 250N000013 HC RX MED GY IP 250 OP 250 PS 637: Performed by: FAMILY MEDICINE

## 2024-06-20 RX ORDER — ACETAMINOPHEN 325 MG/1
975 TABLET ORAL EVERY 6 HOURS
COMMUNITY
Start: 2024-06-20

## 2024-06-20 RX ORDER — IBUPROFEN 800 MG/1
800 TABLET, FILM COATED ORAL EVERY 6 HOURS
COMMUNITY
Start: 2024-06-20

## 2024-06-20 RX ORDER — OXYCODONE HYDROCHLORIDE 5 MG/1
5 TABLET ORAL EVERY 4 HOURS PRN
Qty: 10 TABLET | Refills: 0 | Status: SHIPPED | OUTPATIENT
Start: 2024-06-20 | End: 2024-06-27

## 2024-06-20 RX ADMIN — IBUPROFEN 800 MG: 800 TABLET, FILM COATED ORAL at 06:10

## 2024-06-20 RX ADMIN — ACETAMINOPHEN 975 MG: 325 TABLET, FILM COATED ORAL at 03:10

## 2024-06-20 RX ADMIN — FERROUS SULFATE TAB 325 MG (65 MG ELEMENTAL FE) 325 MG: 325 (65 FE) TAB at 09:09

## 2024-06-20 RX ADMIN — ACETAMINOPHEN 975 MG: 325 TABLET, FILM COATED ORAL at 09:08

## 2024-06-20 RX ADMIN — IBUPROFEN 800 MG: 800 TABLET, FILM COATED ORAL at 00:02

## 2024-06-20 RX ADMIN — SENNOSIDES AND DOCUSATE SODIUM 2 TABLET: 8.6; 5 TABLET ORAL at 09:08

## 2024-06-20 ASSESSMENT — ACTIVITIES OF DAILY LIVING (ADL)
ADLS_ACUITY_SCORE: 20

## 2024-06-20 NOTE — DISCHARGE SUMMARY
[facility] Discharge Summary    Mecca Bills MRN# [id number]   Age: 34 year old YOB: 1990     Date of Admission:  2024  Date of Discharge::  2024  Admitting Physician:  Yodit Lomeli MD  Discharge Physician:  Juan Dukes MD            Admission Diagnoses:   Supervision of high-risk pregnancy of elderly primigravida  Active Problems:    Supervision of high-risk pregnancy of elderly primigravida               Discharge Diagnosis:     Active Problems:    Supervision of high-risk pregnancy of elderly primigravida               Procedures:     Procedure(s): Primary low transverse  section            Medications Prior to Admission:     Facility-Administered Medications Prior to Admission   Medication Dose Route Frequency Provider Last Rate Last Admin    rho(D) immune globulin (RHOPHYLAC) injection 300 mcg  300 mcg Intramuscular Once Tylor Mederos MD         Medications Prior to Admission   Medication Sig Dispense Refill Last Dose    Ferrous Sulfate (IRON PO) Take 1 tablet by mouth daily                Discharge Medications:     Current Discharge Medication List        START taking these medications    Details   acetaminophen (TYLENOL) 325 MG tablet Take 3 tablets (975 mg) by mouth every 6 hours    Associated Diagnoses: S/P  section      ibuprofen (ADVIL/MOTRIN) 800 MG tablet Take 1 tablet (800 mg) by mouth every 6 hours    Associated Diagnoses: S/P  section      oxyCODONE (ROXICODONE) 5 MG tablet Take 1 tablet (5 mg) by mouth every 4 hours as needed for moderate to severe pain  Qty: 10 tablet, Refills: 0    Associated Diagnoses: S/P  section           CONTINUE these medications which have NOT CHANGED    Details   Ferrous Sulfate (IRON PO) Take 1 tablet by mouth daily                     Consultations:   No consultations were requested during this admission          Brief History of Labor or Admission:              Hospital Course:    The patient's hospital course was unremarkable.  She had an uneventful . She recovered as anticipated and experienced nopost-operative complications. On discharge, her pain was well controlled. Vaginal bleeding is similar to peak menstrual flow.  Voiding without difficulty.  Ambulating well and tolerating a normal diet.  No fever or significant wound drainage.  Breast feeding well.  Infant is stable. She was discharged on post-partum day #2.    Post-partum hemoglobin:   Hemoglobin   Date Value Ref Range Status   2024 10.3 (L) 11.7 - 15.7 g/dL Final             Discharge Instructions and Follow-Up:     Discharge diet: Advance to a regular diet as tolerated   Discharge activity: No heavy lifting, pushing, pulling for 2 week(s)  Pelvic rest: abstain from intercourse and do not use tampons for 2 week(s) and pain free   Discharge follow-up: Follow up with primary care provider in 6-8 weeks, sooner if BP issues and directed by OB staff   Wound care: Drink plenty of fluids  Ice to area for comfort  Keep wound clean and dry           Discharge Disposition:     Discharged to home          Juan Dukes MD

## 2024-06-20 NOTE — PROGRESS NOTES
S: Discharge  to home with .    B: Patient had a  delivery with no complications. Baby boy Baby's name Asher, breast: . Support person's name Frankie.     A: VSS. Afebrile.  incision WNL. Pain managed with Scheduled Tylenol and IB with relief. Up independently. Attending to baby's needs. Breastfeeding with confidence.    R: Discharge instructions reviewed and questions answered.  Belongings gathered and returned to the patient. Agreed to follow up in 6 weeks or sooner with any question or concerns.     Nursing Discharge Checklist:    Pneumovax screened and given, if appropriate: YES  Influenza vaccine screened and given, if appropriate: YES  Staples removed (): N/A  Breast milk returned: YES  Hydrogel pads sent home:YES  Birth Certificate Done: YES  Public Health Referral Made: YES

## 2024-06-20 NOTE — DISCHARGE INSTRUCTIONS
Section: What to Expect at Home  Your Recovery     A  section, or , is surgery to deliver your baby through a cut that the doctor makes in your lower belly and uterus. The cut is called an incision.  You may have some pain in your lower belly and need pain medicine for 1 to 2 weeks. You can expect some vaginal bleeding for several weeks. You will probably need about 6 weeks to fully recover.  It's important to take it easy while the incision heals. Avoid heavy lifting, strenuous activities, and exercises that strain the belly muscles while you recover. Ask a family member or friend for help with housework, cooking, and shopping.  This care sheet gives you a general idea about how long it will take for you to recover. But each person recovers at a different pace. Follow the steps below to get better as quickly as possible.  How can you care for yourself at home?  Activity    Rest when you feel tired. Getting enough sleep will help you recover.     Try to walk each day. Start by walking a little more than you did the day before. Bit by bit, increase the amount you walk. Walking boosts blood flow and helps prevent pneumonia, constipation, and blood clots.     Avoid strenuous activities, such as bicycle riding, jogging, weightlifting, and aerobic exercise, for 6 weeks or until your doctor says it is okay.     Until your doctor says it is okay, do not lift anything heavier than your baby.     Do not do sit-ups or other exercises that strain the belly muscles for 6 weeks or until your doctor says it is okay.     Hold a pillow over your incision when you cough or take deep breaths. This will support your belly and decrease your pain.     You may shower as usual. Pat the incision dry when you are done.     You will have some vaginal bleeding. Wear sanitary pads. Do not douche or use tampons until your doctor says it is okay.     Ask your doctor when you can drive again.     You will probably need  to take at least 6 weeks off work. It depends on the type of work you do and how you feel.     Ask your doctor when it is okay for you to have sex.   Diet    You can eat your normal diet. If your stomach is upset, try bland, low-fat foods like plain rice, broiled chicken, toast, and yogurt.     Drink plenty of fluids (unless your doctor tells you not to).     You may notice that your bowel movements are not regular right after your surgery. This is common. Try to avoid constipation and straining with bowel movements. You may want to take a fiber supplement every day. If you have not had a bowel movement after a couple of days, ask your doctor about taking a mild laxative.     If you are breastfeeding, limit alcohol. Alcohol can cause a lack of energy and other health problems for the baby when a breastfeeding woman drinks heavily. It can also get in the way of a mom's ability to feed her baby or to care for the child in other ways. There isn't a lot of research about exactly how much alcohol can harm a baby. Having no alcohol is the safest choice for your baby. If you choose to have a drink now and then, have only one drink, and limit the number of occasions that you have a drink. Wait to breastfeed at least 2 hours after you have a drink to reduce the amount of alcohol the baby may get in the milk.   Medicines    Your doctor will tell you if and when you can restart your medicines. You will also get instructions about taking any new medicines.     If you stopped taking aspirin or some other blood thinner, your doctor will tell you when to start taking it again.     Take pain medicines exactly as directed.  If the doctor gave you a prescription medicine for pain, take it as prescribed.  If you are not taking a prescription pain medicine, ask your doctor if you can take an over-the-counter medicine.     If you think your pain medicine is making you sick to your stomach:  Take your medicine after meals (unless your  doctor has told you not to).  Ask your doctor for a different pain medicine.     If your doctor prescribed antibiotics, take them as directed. Do not stop taking them just because you feel better. You need to take the full course of antibiotics.   Incision care    If you have strips of tape on the incision, leave the tape on for a week or until it falls off.     Wash the area daily with warm, soapy water, and pat it dry. Don't use hydrogen peroxide or alcohol, which can slow healing. You may cover the area with a gauze bandage if it weeps or rubs against clothing. Change the bandage every day.     Keep the area clean and dry.   Other instructions    If you breastfeed your baby, you may be more comfortable while you are healing if you don't rest your baby on your belly. Try tucking your baby under your arm, with your baby's body along the side you will be feeding on. Support your baby's upper body with your arm. With that hand you can control your baby's head to bring your baby's mouth to your breast. This is sometimes called the football hold.   Follow-up care is a key part of your treatment and safety. Be sure to make and go to all appointments, and call your doctor if you are having problems. It's also a good idea to know your test results and keep a list of the medicines you take.  When should you call for help?  Share this information with your partner, family, or a friend. They can help you watch for warning signs.  Call 911  anytime you think you may need emergency care. For example, call if:    You feel you cannot stop from hurting yourself, your baby, or someone else.     You passed out (lost consciousness).     You have chest pain, are short of breath, or cough up blood.     You have a seizure.   Where to get help 24 hours a day, 7 days a week   If you or someone you know talks about suicide, self-harm, a mental health crisis, a substance use crisis, or any other kind of emotional distress, get help right  away. You can:    Call the Suicide and Crisis Lifeline at 988.     Call 4-379-479-TALK (1-849.445.2982).     Text HOME to 468862 to access the Crisis Text Line.   Consider saving these numbers in your phone.  Go to [employer] for more information or to chat online.  Call your doctor or midwife now or seek immediate medical care if:    You have loose stitches, or your incision comes open.     You have signs of hemorrhage (too much bleeding), such as:  Heavy vaginal bleeding. This means that you are soaking through one or more pads in an hour. Or you pass blood clots bigger than an egg.  Feeling dizzy or lightheaded, or you feel like you may faint.  Feeling so tired or weak that you cannot do your usual activities.  A fast or irregular heartbeat.  New or worse belly pain.     You have symptoms of infection, such as:  Increased pain, swelling, warmth, or redness.  Red streaks leading from the incision.  Pus draining from the incision.  A fever.  Frequent or painful urination or blood in your urine.  Vaginal discharge that smells bad.  New or worse belly pain.     You have symptoms of a blood clot in your leg (called a deep vein thrombosis), such as:  Pain in the calf, back of the knee, thigh, or groin.  Swelling in the leg or groin.  A color change on the leg or groin. The skin may be reddish or purplish, depending on your usual skin color.     You have signs of preeclampsia, such as:  Sudden swelling of your face, hands, or feet.  New vision problems (such as dimness, blurring, or seeing spots).  A severe headache.     You have signs of heart failure, such as:  New or increased shortness of breath.  New or worse swelling in your legs, ankles, or feet.  Sudden weight gain, such as more than 2 to 3 pounds in a day or 5 pounds in a week.  Feeling so tired or weak that you cannot do your usual activities.     You had spinal or epidural pain relief and have:  New or worse back pain.  Increased pain, swelling, warmth,  "or redness at the injection site.  Tingling, weakness, or numbness in your legs or groin.   Watch closely for changes in your health, and be sure to contact your doctor or midwife if:    Your vaginal bleeding isn't decreasing.     You feel sad, anxious, or hopeless for more than a few days.     You are having problems with your breasts or breastfeeding.   Where can you learn more?  Go to https://www.[employer]net/patiented  Enter M806 in the search box to learn more about \" Section: What to Expect at Home.\"  Current as of: July 10, 2023               Content Version: 14.0    6930-3120 [email or web address].   Care instructions adapted under license by your healthcare professional. If you have questions about a medical condition or this instruction, always ask your healthcare professional. [email or web address] disclaims any warranty or liability for your use of this information.      "

## 2024-06-20 NOTE — PLAN OF CARE
Goal Outcome Evaluation:    Day 2 post primary  for failure to progress/tolerate labor. VSS. Fundus firm at U-1, light bleeding. Incision well approximated with liquid bandage. Has abd binder, ice packs prn. Has had gas/constipation pains, one small BM in middle of night has provided some pain relief. Pain otherwise well managed with Tylenol and Ibuprofen. Lanolin, nipple cream and hydrogels for nipple discomfort. Baby is latching well. Independent in cares for self and baby. Spouse is at bedside and attentive with all cares. Hopeful to discharge home later today.

## 2024-06-21 NOTE — PROGRESS NOTES
Care Transitions Note    CTS has been following pt's cord tissue.  Per  drug detection panel on cord tissue segment is positive. (for what substance)     Per [place] policy, mandated child protection report was made to [facility] Child Protection intake on , due to this patient's positive urine screen for marijuana.  Patient's baby's cord tissue drug screen has now come back positive for marijuana as well.    Writer has faxed the positive results to [facility] CP intake today, 24.      No other CTS needs at this time.     DARI Velazco  [facility]   [phone]

## 2024-06-24 ENCOUNTER — PRENATAL OFFICE VISIT (OUTPATIENT)
Dept: FAMILY MEDICINE | Facility: CLINIC | Age: 34
End: 2024-06-24
Payer: COMMERCIAL

## 2024-06-24 DIAGNOSIS — D50.8 OTHER IRON DEFICIENCY ANEMIA: ICD-10-CM

## 2024-06-24 DIAGNOSIS — Z98.891 S/P CESAREAN SECTION: Primary | ICD-10-CM

## 2024-06-24 PROCEDURE — 99213 OFFICE O/P EST LOW 20 MIN: CPT | Mod: 25 | Performed by: FAMILY MEDICINE

## 2024-06-24 RX ORDER — FERROUS GLUCONATE 324(38)MG
324 TABLET ORAL
Qty: 30 TABLET | Refills: 0 | Status: SHIPPED | OUTPATIENT
Start: 2024-06-24 | End: 2024-08-14

## 2024-06-24 ASSESSMENT — PAIN SCALES - GENERAL: PAINLEVEL: NO PAIN (0)

## 2024-06-24 NOTE — PROGRESS NOTES
"Mecca Bills  Gender: female  : 1990  [address] [facility] [address]  [phone] (home)   Medical Record: [id number]  Primary Care Provider: No Ref-Primary, Physician       [facility]   ?   Discharge Phone Call: Key Words/Key Times     How are you and the baby? good    How are feedings going? good    Voiding & Stooling? good    Any questions or concerns? no    Follow-up appointment? \"This afternoon\"      We want to provide excellent care here at [facility]. Do you have any feedback for us that would help us improve?     Call back COMMENTS:   \"Everything so great\"      Attempted Calls:   _2024 1152 callback completed per KRYSTIN Reynolds RN________     __________    "

## 2024-06-27 VITALS
HEART RATE: 114 BPM | DIASTOLIC BLOOD PRESSURE: 80 MMHG | OXYGEN SATURATION: 98 % | BODY MASS INDEX: 23.25 KG/M2 | SYSTOLIC BLOOD PRESSURE: 122 MMHG | WEIGHT: 167 LBS | TEMPERATURE: 97.4 F

## 2024-06-27 PROBLEM — Z98.891 S/P CESAREAN SECTION: Status: ACTIVE | Noted: 2024-06-27

## 2024-06-28 NOTE — PROGRESS NOTES
Mecca Bills is a 34 year old  female here for follow up on her recent .  She needs an incision check.  She also needs an iron Rx, didn't get that at discharge.     OB History    Para Term  AB Living   1 1 1 0 0 1   SAB IAB Ectopic Multiple Live Births   0 0 0 0 1      # Outcome Date GA Lbr Roberto/2nd Weight Sex Type Anes PTL Lv   1 Term 24 41w0d  3.375 kg (7 lb 7.1 oz) M CS-LTranv Spinal N SARAH      Complications: Fetal Intolerance, Dysfunctional Labor, Failure to Progress in First Stage      Name: Asher Garcia      Apgar1: 8  Apgar5: 9      Obstetric Comments   EDC 2024 based on LMP.  Parenting with Frankie.        She is doing well.  She denies bleeding or drainage from the incision. Pain is well controlled.    No fever.      O:    /80   Pulse 114   Temp 97.4  F (36.3  C) (Temporal)   Wt 75.8 kg (167 lb)   LMP 2023   SpO2 98%   Breastfeeding Yes   BMI 23.25 kg/m   Vitals noted.  Patient alert, oriented, and in no acute distress. Her incision appears to be healing well without wound dehiscence, bleeding, erythema or drainage.    Uterine fundus is mildly tender with palpation, firm and below the umbilicus.     Extremities warm and dry without cyanosis, clubbing or edema.     A:     ICD-10-CM    1. S/P  section  Z98.891       2. Other iron deficiency anemia  D50.8 ferrous gluconate (FERGON) 324 (38 Fe) MG tablet           P:  Continue routine postpartum check up at 8 weeks.  I sent in a Rx for iron, see orders. Will f/u sooner with any concerns, watch for redness, drainage, or warmth of the incision.  Wound care discussed.      Yodit Lomeli MD

## 2024-07-19 ENCOUNTER — MEDICAL CORRESPONDENCE (OUTPATIENT)
Dept: HEALTH INFORMATION MANAGEMENT | Facility: CLINIC | Age: 34
End: 2024-07-19
Payer: COMMERCIAL

## 2024-07-31 ENCOUNTER — MYC MEDICAL ADVICE (OUTPATIENT)
Dept: FAMILY MEDICINE | Facility: CLINIC | Age: 34
End: 2024-07-31
Payer: COMMERCIAL

## 2024-08-02 NOTE — TELEPHONE ENCOUNTER
Forms/Letter Request    Type of form/letter: FMLA - Unknown      Do se have the form/letter: Yes: put on Dr. Lomeli's basket    Who is the form from? Insurance comp    Where did/will the form come from? form was sent via [employer]    When is form/letter needed by: today    How would you like the form/letter returned: Fax : [phone],     Patient Notified form requests are processed in 5-7 business days:Yes    Could we send this information to you in [employer] or would you prefer to receive a phone call?:   Patient would prefer a phone call   Okay to leave a detailed message?: Yes at Cell number on file:    Telephone Information:   Mobile [phone]

## 2024-08-09 NOTE — TELEPHONE ENCOUNTER
Please let patient know Dr. Lomeli is out of office, returning next week.    Will have staff notify patient.     Carter Kay MD

## 2024-08-12 NOTE — TELEPHONE ENCOUNTER
I don't have this form. I believe this was completed a while ago. Can you please verify, find out whether she received it or not and then close encounter if done? Thank you.   Yodit Lomeli MD

## 2024-08-13 ENCOUNTER — MEDICAL CORRESPONDENCE (OUTPATIENT)
Dept: HEALTH INFORMATION MANAGEMENT | Facility: CLINIC | Age: 34
End: 2024-08-13

## 2024-08-13 ENCOUNTER — PRENATAL OFFICE VISIT (OUTPATIENT)
Dept: FAMILY MEDICINE | Facility: CLINIC | Age: 34
End: 2024-08-13
Payer: COMMERCIAL

## 2024-08-13 VITALS
SYSTOLIC BLOOD PRESSURE: 118 MMHG | TEMPERATURE: 98.6 F | DIASTOLIC BLOOD PRESSURE: 68 MMHG | WEIGHT: 156.5 LBS | BODY MASS INDEX: 21.91 KG/M2 | HEART RATE: 82 BPM | RESPIRATION RATE: 16 BRPM | HEIGHT: 71 IN | OXYGEN SATURATION: 98 %

## 2024-08-13 DIAGNOSIS — Z30.011 INITIATION OF ORAL CONTRACEPTION: ICD-10-CM

## 2024-08-13 DIAGNOSIS — F41.1 GENERALIZED ANXIETY DISORDER: ICD-10-CM

## 2024-08-13 DIAGNOSIS — Z12.4 SCREENING FOR MALIGNANT NEOPLASM OF CERVIX: ICD-10-CM

## 2024-08-13 DIAGNOSIS — F33.0 MILD EPISODE OF RECURRENT MAJOR DEPRESSIVE DISORDER (H): ICD-10-CM

## 2024-08-13 LAB — HGB BLD-MCNC: 14 G/DL (ref 11.7–15.7)

## 2024-08-13 PROCEDURE — G0123 SCREEN CERV/VAG THIN LAYER: HCPCS | Performed by: FAMILY MEDICINE

## 2024-08-13 PROCEDURE — 36415 COLL VENOUS BLD VENIPUNCTURE: CPT | Performed by: FAMILY MEDICINE

## 2024-08-13 PROCEDURE — 87624 HPV HI-RISK TYP POOLED RSLT: CPT | Performed by: FAMILY MEDICINE

## 2024-08-13 PROCEDURE — 99207 PR POST PARTUM EXAM: CPT | Performed by: FAMILY MEDICINE

## 2024-08-13 RX ORDER — ACETAMINOPHEN AND CODEINE PHOSPHATE 120; 12 MG/5ML; MG/5ML
0.35 SOLUTION ORAL DAILY
Qty: 91 TABLET | Refills: 3 | Status: SHIPPED | OUTPATIENT
Start: 2024-08-13

## 2024-08-13 NOTE — TELEPHONE ENCOUNTER
I spoke with the patient today. I printed off what she sent to [facility] for Dr. Lomeli to initial

## 2024-08-13 NOTE — PROGRESS NOTES
Mecca is here for a 6-week postpartum checkup.    She had a c/s for maternal complications of a viable boy, weight 7 pounds 7 oz., with none complications. Date of delivery was 24. Since delivery, she has been breast feeding.  She has No signs of infection, bleeding or other complications.  She is not pregnant.  We discussed contraceptions and she has chosen pill.     Post partum tubal: No  History of Gestational Diabetes? No  Type of Delivery:    Feeding Method:  Breast and formula  If initiated breast feeding and stopped, how long did you breast feed?:  pumping      REVIEW OF SYSTEMS:  Ears/Nose/Throat: negative  Respiratory: negative  Cardiovascular: negative  Gastrointestinal: negative  Genitourinary: negative  Musculoskeletal: negative    Neurologic: negative   Skin: negative   Endocrine:  negative  Psych:positive for postpartum depression and anxiety but currently doing well. Has good support in place and is not requiring medication. No concern for safety. She is going to the gym, doing tasha and yoga and it helps.   She is returning to work on Monday but her FMLA will be in place until  in case days off needed for baby. Needs paperwork completed today for update.       Past Medical History:   Diagnosis Date    History of anxiety     History of chlamydia     History of infertility        Past Surgical History:   Procedure Laterality Date     SECTION N/A 2024    Procedure:  SECTION;  Surgeon: Ryan Carrera MD;  Location: [facility]    WISDOM TOOTH EXTRACTION         Family History   Problem Relation Age of Onset    Skin Cancer Mother     Pacemaker Father     No Known Problems Sister     No Known Problems Sister     Diabetes Maternal Grandmother     No Known Problems Maternal Grandfather     No Known Problems Paternal Grandmother     No Known Problems Paternal Grandfather     Breast Cancer No family hx of            EXAM:  /68   Pulse 82   Temp 98.6  F (37  C)  "(Temporal)   Resp 16   Ht 1.803 m (5' 11\")   Wt 71 kg (156 lb 8 oz)   LMP 09/05/2023   SpO2 98%   Breastfeeding Yes   BMI 21.83 kg/m    HEENT: grossly normal.  NECK: no lymphadenopathy or thyroidomegaly.  LUNGS: CTA X 2, no rales or crackles.  BACK: No spinal or CVA tenderness.  HEART: RRR without murmurs clicks or gallops.  ABDOMEN: soft, non tender, good bowel sounds, without masses rebound, guarding or tenderness.  INCISION: Pfannenstiel incision appears to be healing nicely  PELVIC:  External genitalia; normal without lesion, well healed.   Vagina: normal mucosa and rugae, no discharge.  Cervix: normal without lesion.  Pap cotest obtained. Uterus: non pregnant in size, firm , mobile, no lesions, Normal shape, position and consistency  Adnexa: non tender, without masses.    EXTREMITIES:  warm to touch, good pulses, no ankle edema or calf tenderness.  NEUROLOGIC: grossly normal.    ASSESSMENT:   6-week postpartum exam after c/s for FTP and fetal intolerance.     ICD-10-CM    1. Routine postpartum follow-up  Z39.2 OB HEMOGLOBIN      2. Screening for malignant neoplasm of cervix  Z12.4 Pap Thin Layer Screen with HPV - Recommended Age 30 - 65 Years      3. Initiation of oral contraception  Z30.011 norethindrone (MICRONOR) 0.35 MG tablet      4. Mild episode of recurrent major depressive disorder (H24)  F33.0       5. Generalized anxiety disorder  F41.1            PLAN:    Contraception methods discussed at length. She wants to wait about a year and then consider another pregnancy. Opted for progesterone only pills, see order.   Exercise encouraged ongoing for physical and mental health.   Follow up in 1 year for next physical   Pap obtained, if normal repeat q 3 years or per ACOG guidelines.   Hemoglobin obtained  Discussed postpartum depression symptoms to watch for and when to follow up.   [place] papers completed.     Yodit Lomeli MD      "

## 2024-08-14 PROBLEM — Z98.891 S/P CESAREAN SECTION: Status: RESOLVED | Noted: 2024-06-27 | Resolved: 2024-08-14

## 2024-08-14 PROBLEM — O09.519 SUPERVISION OF HIGH-RISK PREGNANCY OF ELDERLY PRIMIGRAVIDA: Status: RESOLVED | Noted: 2024-06-14 | Resolved: 2024-08-14

## 2024-08-14 NOTE — RESULT ENCOUNTER NOTE
Mecca,  your blood count is back to normal. You do NOT need the iron, but you can still take it if you'd like since you have it.   Yodit Lomeli MD

## 2024-08-15 LAB
HPV HR 12 DNA CVX QL NAA+PROBE: NEGATIVE
HPV16 DNA CVX QL NAA+PROBE: NEGATIVE
HPV18 DNA CVX QL NAA+PROBE: NEGATIVE
HUMAN PAPILLOMA VIRUS FINAL DIAGNOSIS: NORMAL

## 2024-08-19 LAB
BKR LAB AP GYN ADEQUACY: NORMAL
BKR LAB AP GYN INTERPRETATION: NORMAL
BKR LAB AP PREVIOUS ABNORMAL: NORMAL
PATH REPORT.COMMENTS IMP SPEC: NORMAL
PATH REPORT.COMMENTS IMP SPEC: NORMAL
PATH REPORT.RELEVANT HX SPEC: NORMAL

## 2024-10-23 ENCOUNTER — NURSE TRIAGE (OUTPATIENT)
Dept: FAMILY MEDICINE | Facility: OTHER | Age: 34
End: 2024-10-23
Payer: COMMERCIAL

## 2024-10-24 NOTE — TELEPHONE ENCOUNTER
IF OKAY TO KEEP HER APPOINTMENT TOMORROW PATIENT DOES NOT NEED A CALL BACK.        Nurse Triage SBAR    Is this a 2nd Level Triage? YES, LICENSED PRACTITIONER REVIEW IS REQUIRED    Situation: RN called to to triage for appointment tomorrow.    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 She would chew her food into very small pieces to avoid choking.  She had a baby June 18th and post partum anxiety has increased. Past 3-4 weeks she has noticed with swallowing food it seems like her muscles forget what to do and the food gets stuck mid-swallow. She can clear this with drinking or coughing.    Assessment: Patient denies any recent illness or throat pain.   Does not have issues with swallowing fluids or her own saliva.   Texture of food plays a factor in her ability to swallow. Had noodles for lunch today and swallowed just fine.   She does report weight loss due to not eating as much from fear of choking.     Protocol Recommended Disposition:   See in Office Today    Recommendation: Patient is scheduled to be seen in office tomorrow. Are we okay to keep this appointment or does she need to go to urgent care today?     Routed to provider      Reason for Disposition   Swallowing difficulty and cause unknown  (Exception: Difficulty swallowing is a chronic symptom.)    Additional Information   Negative: SEVERE difficulty swallowing (e.g., drooling or spitting) and started suddenly after taking a medicine or allergic foods   Negative: Wheezing, stridor, hoarseness, or difficulty breathing   Negative: Swollen tongue and sudden onset   Negative: Sounds like a life-threatening emergency to the triager   Negative: Sore throat (throat pain with swallowing)   Negative: SEVERE difficulty swallowing (e.g., drooling or spitting, can't swallow water)   Negative: Symptoms of food or bone stuck in throat or esophagus (e.g., pain in throat or chest, FB sensation, blood-tinged saliva)   Negative: SEVERE symptoms of  "pill stuck in throat or esophagus (e.g., severe pain, bleeding, or difficulty swallowing liquids)   Negative: Drinking very little and dehydration suspected (e.g., no urine > 12 hours, very dry mouth, very lightheaded)   Negative: Refuses to drink anything for > 12 hours   Negative: Patient sounds very sick or weak to the triager   Negative: Fever > 100.4 F (38.0 C)   Negative: Coughing spells occur during or within 2 hours after eating/feedings   Negative: Symptoms of pill stuck in throat or esophagus (e.g., pain in throat or chest, FB sensation) and no relief after using Care Advice   Negative: Weak immune system (e.g., HIV positive, cancer chemo, splenectomy, organ transplant, chronic steroids)    Answer Assessment - Initial Assessment Questions  1. DESCRIPTION: \"Tell me more about this problem.\" \"Are you  having trouble swallowing liquids, solids, or both?\" \"Any trouble with swallowing saliva (spit)?\"      Solids only  2. SEVERITY: \"How bad is the swallowing difficulty?\"  (e.g., Scale 1-10; or mild, moderate, severe)    - MILD (0-3): Occasional swallowing difficulty; has trouble swallowing certain types of foods or liquids.    - MODERATE (4-7): Frequent swallowing difficulty; only able to swallow small amounts of foods and fluids.    - SEVERE (8-10): Unable to swallow any foods, fluids, or saliva; sensation of \"lump in throat\" or \"something stuck in throat\", and frequent drooling or spitting may be present.      Mild to moderate  3. ONSET: \"When did the swallowing problems begin?\"       About 3-4 weeks ago  4. CAUSE: \"What do you think is causing the problem?\"  (e.g., dry mouth, food or pill stuck in throat, mouth pain, sore throat, progression of disease process such as dementia or Parkinson's disease).       Patient thinks this is related to anxiety. She has always had fear of choking and seems to have gotten worse with post partum anxiety  5. CHRONIC or RECURRENT: \"Is this a new problem for you?\"  If No, ask: " "\"How long have you had this problem?\" (e.g., days, weeks, months)       Has had a fear of choking all her life. But the issue of difficulty swallowing started 3-4 weeks ago.  6. OTHER SYMPTOMS: \"Do you have any other symptoms?\" (e.g., chest pain, difficulty breathing, mouth sores, sore throat, swollen tongue, chest pain)      No other difficulties, can swallow fluids just fine.  7. PREGNANCY: \"Is there any chance you are pregnant?\" \"When was your last menstrual period?\"      Had baby June 18th.    Protocols used: Swallowing Difficulty-A-[place]    "

## 2024-10-24 NOTE — TELEPHONE ENCOUNTER
RN Triage    Patient Contact    Attempt # 1    RN did attempt to reach patient, no answer, left message for her to call clinic back and ask to speak to a nurse.     Wanting to triage per appointment notes for tomorrow 10/25.      Maddie Mi, RN on 10/24/2024 at 11:17 AM

## 2024-10-25 NOTE — TELEPHONE ENCOUNTER
Reached out to patient, she was unable to reschedule with RN on the phone due to unreliable transportation, unknown status for next week. Reports she will reach out to team next week to assist with scheduling.     Maddie Mi RN, BSN

## 2024-11-24 ENCOUNTER — HEALTH MAINTENANCE LETTER (OUTPATIENT)
Age: 34
End: 2024-11-24

## (undated) DEVICE — SU MONOCRYL 0 CT-1 36" UND Y946H

## (undated) DEVICE — SUCTION MANIFOLD NEPTUNE 2 SYS 4 PORT 0702-020-000

## (undated) DEVICE — ESU PENCIL SMOKE EVAC W/ROCKER SWITCH 0703-047-000

## (undated) DEVICE — CATH TRAY FOLEY 16FR DRAINAGE BAG STATLOCK 899916

## (undated) DEVICE — STPL SKIN SUBCUTICULAR INSORB 2025

## (undated) DEVICE — GLOVE PROTEXIS W/NEU-THERA 7.5  2D73TE75

## (undated) DEVICE — PACK C-SECTION

## (undated) DEVICE — STOCKING SLEEVE COMPRESSION CALF MED

## (undated) DEVICE — SOL WATER IRRIG 1000ML BOTTLE 2F7114

## (undated) DEVICE — CLAMP CORD

## (undated) DEVICE — PREP CHLORAPREP 26ML TINTED ORANGE  260815

## (undated) DEVICE — SOL NACL 0.9% IRRIG 1000ML BOTTLE 07138-09

## (undated) DEVICE — ADH SKIN CLOSURE PREMIERPRO EXOFIN 1.0ML 3470

## (undated) RX ORDER — FENTANYL CITRATE-0.9 % NACL/PF 10 MCG/ML
PLASTIC BAG, INJECTION (ML) INTRAVENOUS
Status: DISPENSED
Start: 2024-06-18

## (undated) RX ORDER — PROPOFOL 10 MG/ML
INJECTION, EMULSION INTRAVENOUS
Status: DISPENSED
Start: 2023-11-01

## (undated) RX ORDER — OXYTOCIN 10 [USP'U]/ML
INJECTION, SOLUTION INTRAMUSCULAR; INTRAVENOUS
Status: DISPENSED
Start: 2024-06-18

## (undated) RX ORDER — SEVOFLURANE 250 ML/250ML
LIQUID RESPIRATORY (INHALATION)
Status: DISPENSED
Start: 2023-11-01

## (undated) RX ORDER — ONDANSETRON 2 MG/ML
INJECTION INTRAMUSCULAR; INTRAVENOUS
Status: DISPENSED
Start: 2024-06-18

## (undated) RX ORDER — OXYTOCIN/0.9 % SODIUM CHLORIDE 30/500 ML
PLASTIC BAG, INJECTION (ML) INTRAVENOUS
Status: DISPENSED
Start: 2024-06-18

## (undated) RX ORDER — MORPHINE SULFATE 1 MG/ML
INJECTION, SOLUTION EPIDURAL; INTRATHECAL; INTRAVENOUS
Status: DISPENSED
Start: 2024-06-18

## (undated) RX ORDER — LIDOCAINE HYDROCHLORIDE 10 MG/ML
INJECTION, SOLUTION EPIDURAL; INFILTRATION; INTRACAUDAL; PERINEURAL
Status: DISPENSED
Start: 2023-11-01